# Patient Record
Sex: MALE | Race: OTHER | HISPANIC OR LATINO | Employment: PART TIME
[De-identification: names, ages, dates, MRNs, and addresses within clinical notes are randomized per-mention and may not be internally consistent; named-entity substitution may affect disease eponyms.]

---

## 2023-02-07 ENCOUNTER — OFFICE VISIT (OUTPATIENT)
Dept: INTERNAL MEDICINE | Facility: OTHER | Age: 68
End: 2023-02-07
Payer: MEDICARE

## 2023-02-07 VITALS
SYSTOLIC BLOOD PRESSURE: 133 MMHG | HEART RATE: 82 BPM | HEIGHT: 65 IN | DIASTOLIC BLOOD PRESSURE: 74 MMHG | TEMPERATURE: 97.3 F | WEIGHT: 163.8 LBS | OXYGEN SATURATION: 96 % | BODY MASS INDEX: 27.29 KG/M2

## 2023-02-07 DIAGNOSIS — I10 ESSENTIAL HYPERTENSION: ICD-10-CM

## 2023-02-07 DIAGNOSIS — Z00.00 ENCOUNTER FOR SCREENING AND PREVENTATIVE CARE: ICD-10-CM

## 2023-02-07 DIAGNOSIS — E11.9 TYPE 2 DIABETES MELLITUS WITHOUT COMPLICATION, WITHOUT LONG-TERM CURRENT USE OF INSULIN (HCC): ICD-10-CM

## 2023-02-07 DIAGNOSIS — Z11.59 ENCOUNTER FOR HEPATITIS C SCREENING TEST FOR LOW RISK PATIENT: ICD-10-CM

## 2023-02-07 DIAGNOSIS — Z43.3 COLOSTOMY CARE (HCC): ICD-10-CM

## 2023-02-07 LAB
HBA1C MFR BLD: 15 % (ref ?–5.8)
POCT INT CON NEG: NEGATIVE
POCT INT CON POS: POSITIVE

## 2023-02-07 PROCEDURE — 99204 OFFICE O/P NEW MOD 45 MIN: CPT | Mod: GC | Performed by: STUDENT IN AN ORGANIZED HEALTH CARE EDUCATION/TRAINING PROGRAM

## 2023-02-07 PROCEDURE — 83036 HEMOGLOBIN GLYCOSYLATED A1C: CPT | Performed by: STUDENT IN AN ORGANIZED HEALTH CARE EDUCATION/TRAINING PROGRAM

## 2023-02-07 RX ORDER — ATORVASTATIN CALCIUM 20 MG/1
20 TABLET, FILM COATED ORAL DAILY
Qty: 90 TABLET | Refills: 0 | Status: SHIPPED | OUTPATIENT
Start: 2023-02-07 | End: 2023-05-10 | Stop reason: SDUPTHER

## 2023-02-07 RX ORDER — GLIPIZIDE 10 MG/1
10 TABLET ORAL 2 TIMES DAILY
Qty: 60 TABLET | Refills: 0 | Status: SHIPPED | OUTPATIENT
Start: 2023-02-07 | End: 2023-05-10 | Stop reason: SDUPTHER

## 2023-02-07 RX ORDER — ATORVASTATIN CALCIUM 20 MG/1
20 TABLET, FILM COATED ORAL DAILY
COMMUNITY
Start: 2023-01-23 | End: 2023-02-07 | Stop reason: SDUPTHER

## 2023-02-07 RX ORDER — LANCETS 30 GAUGE
EACH MISCELLANEOUS
Qty: 100 EACH | Refills: 0 | Status: SHIPPED | OUTPATIENT
Start: 2023-02-07 | End: 2023-06-05 | Stop reason: SDUPTHER

## 2023-02-07 RX ORDER — AMLODIPINE BESYLATE 10 MG/1
10 TABLET ORAL DAILY
Qty: 90 TABLET | Refills: 0 | Status: SHIPPED | OUTPATIENT
Start: 2023-02-07 | End: 2023-02-07

## 2023-02-07 RX ORDER — AMLODIPINE BESYLATE 10 MG/1
10 TABLET ORAL DAILY
COMMUNITY
End: 2023-02-07 | Stop reason: SDUPTHER

## 2023-02-07 RX ORDER — ENALAPRIL MALEATE 10 MG/1
10 TABLET ORAL DAILY
Qty: 90 TABLET | Refills: 0 | Status: SHIPPED | OUTPATIENT
Start: 2023-02-07 | End: 2023-05-10 | Stop reason: SDUPTHER

## 2023-02-07 RX ORDER — ENALAPRIL MALEATE 10 MG/1
10 TABLET ORAL DAILY
COMMUNITY
Start: 2023-01-17 | End: 2023-02-07 | Stop reason: SDUPTHER

## 2023-02-07 RX ORDER — GLIPIZIDE 10 MG/1
10 TABLET ORAL 2 TIMES DAILY
COMMUNITY
Start: 2023-01-17 | End: 2023-02-07 | Stop reason: SDUPTHER

## 2023-02-07 RX ORDER — GLUCOSAMINE HCL/CHONDROITIN SU 500-400 MG
CAPSULE ORAL
Qty: 100 EACH | Refills: 0 | Status: SHIPPED | OUTPATIENT
Start: 2023-02-07 | End: 2023-06-05 | Stop reason: SDUPTHER

## 2023-02-07 ASSESSMENT — ENCOUNTER SYMPTOMS
BLOOD IN STOOL: 0
DOUBLE VISION: 0
SHORTNESS OF BREATH: 0
NAUSEA: 0
CONSTIPATION: 0
FEVER: 0
VOMITING: 0
COUGH: 0
DIARRHEA: 0
BLURRED VISION: 0
DEPRESSION: 0
SPUTUM PRODUCTION: 0
PALPITATIONS: 0
CHILLS: 0
HEMOPTYSIS: 0

## 2023-02-07 ASSESSMENT — PATIENT HEALTH QUESTIONNAIRE - PHQ9: CLINICAL INTERPRETATION OF PHQ2 SCORE: 0

## 2023-02-07 NOTE — PROGRESS NOTES
Subjective:     CC:  Diagnoses of Type 2 diabetes mellitus without complication, without long-term current use of insulin (HCC), Encounter for screening and preventative care, Encounter for hepatitis C screening test for low risk patient, and Essential hypertension were pertinent to this visit.    HISTORY OF THE PRESENT ILLNESS: Patient is a 67-year-old Zimbabwean-speaking male with a past medical history of uncontrolled type 2 diabetes, hypertension, and ostomy bag secondary to unspecified complications from hernia repair in 2019 who presents to clinic today for a establish care visit. Patient is accompanied his daughter who helped translate and provide information.    Denies any acute complaints at this time.  Patient's daughter reports that the patient was previously living in [place] where he was homeless and bouncing around city.  During this time the patient was noncompliant medical care due to his poor educational background.  Patient's daughter reports that she was able to bring the patient to [place] to live with her and has been working to get his medical care in order.    Patient has been out of his amlodipine for 2 days before clinic visit, otherwise has been taking all of his other medications.    Patient reports that he was previously told he had a heart condition that will likely require surgery in the future, but patient is unsure of what it was.    PSH: Ostomy creation 2019 during hernia repair.  FH: Diabetes in daughter, father, and mother  Social: Currently living house with daughter and 3 grandchildren.  Currently working in [employer] department at [employer].  Tobacco: Denies any previous use.  Alcohol: Reported history of heavy drinking for 30 years before cessation roughly 14 years ago.  Drugs: No previous history of drug use.  Allergies: No known drug allergies.    Problem   Type 2 Diabetes Mellitus Without Complication, Without Long-Term Current Use of Insulin (Hcc)   Essential Hypertension       Health  "Maintenance: Completed    ROS:   Review of Systems   Constitutional:  Negative for chills and fever.   HENT:  Negative for ear pain, hearing loss and tinnitus.    Eyes:  Negative for blurred vision and double vision.   Respiratory:  Negative for cough, hemoptysis, sputum production and shortness of breath.    Cardiovascular:  Negative for chest pain, palpitations and leg swelling.   Gastrointestinal:  Negative for blood in stool, constipation, diarrhea, melena, nausea and vomiting.   Genitourinary:  Negative for dysuria and urgency.   Skin:  Positive for itching. Negative for rash.   Psychiatric/Behavioral:  Negative for depression and suicidal ideas.        Objective:   Exam: /74 (BP Location: Left arm, Patient Position: Sitting, BP Cuff Size: Adult)   Pulse 82   Temp 36.3 °C (97.3 °F) (Temporal)   Ht 1.651 m (5' 5\")   Wt 74.3 kg (163 lb 12.8 oz)   SpO2 96%  Body mass index is 27.26 kg/m².    Physical Exam  Constitutional:       General: He is not in acute distress.     Appearance: Normal appearance. He is normal weight. He is not ill-appearing, toxic-appearing or diaphoretic.   HENT:      Head: Normocephalic and atraumatic.      Mouth/Throat:      Mouth: Mucous membranes are moist.      Pharynx: Oropharynx is clear. No oropharyngeal exudate or posterior oropharyngeal erythema.   Eyes:      General: No scleral icterus.        Right eye: No discharge.         Left eye: No discharge.      Conjunctiva/sclera: Conjunctivae normal.   Cardiovascular:      Rate and Rhythm: Normal rate and regular rhythm.      Pulses: Normal pulses.      Heart sounds: Murmur (3/6 systolic murmur.) heard.     No friction rub. No gallop.   Pulmonary:      Effort: Pulmonary effort is normal. No respiratory distress.      Breath sounds: Normal breath sounds. No stridor. No wheezing or rhonchi.   Abdominal:      General: Abdomen is flat. Bowel sounds are normal. There is no distension.      Palpations: Abdomen is soft. There is no " mass.      Tenderness: There is no abdominal tenderness.      Hernia: No hernia is present.      Comments: Ostomy bag on right lower quadrant with no evidence of infection around the skin.   Musculoskeletal:         General: No swelling or tenderness.      Right lower leg: No edema.      Left lower leg: No edema.   Skin:     General: Skin is warm and dry.      Coloration: Skin is not pale.      Findings: No erythema or rash.      Comments: No open lesions or wounds on foot. Excoriations from itching on both legs.   Neurological:      General: No focal deficit present.      Mental Status: He is alert and oriented to person, place, and time. Mental status is at baseline.         Labs: Please see results section.    Assessment & Plan:   Patient is a 67-year-old Ukrainian-speaking male with a past medical history of uncontrolled type 2 diabetes, hypertension, and ostomy bag secondary to unspecified complications from hernia repair in 2019 who presents to clinic today for a establish care visit. Patient is accompanied his daughter who helped translate and provide information.    1. Type 2 diabetes mellitus without complication, without long-term current use of insulin (HCC)  - POC A1c test unable to be read as it was greater than 13.  - Started patient on lantus pen 15 U QHS and prescribed diabetic supplies.  Patient's daughter says that she currently takes insulin and can teach the patient.  - Prescribed metformin and advised patient to hold off on starting it until he gets his blood work done.  - Educated patient and daughter regarding hypoglycemia signs. Patient expresses understanding.  - Comp Metabolic Panel; Future  - Lipid Profile; Future  - POCT  A1C  - MICROALBUMIN CREAT RATIO URINE; Future  - Referral to Nutrition Services    2. Encounter for screening and preventative care  - Ordered CBC for regular evaluation.  - Will get request of records from previous hospital visits.  - CBC WITH DIFFERENTIAL; Future    3.  Encounter for hepatitis C screening test for low risk patient  - Ordered test for screening.  - HEP C VIRUS ANTIBODY; Future    4. Essential hypertension  - Stopped amlodipine as patient was not taking before visit today.  - Will trial patient on enalapril and reassess at next visit.    5. Colostomy care (HCC)  - No evidence of infection around ostomy bag.  - Will refer patient to general surgery when diabetes is under better control.      I spent a total of 45 minutes with record review, exam, communication with the patient, communication with other providers, and documentation of this encounter.    Return in about 5 weeks (around 3/14/2023).

## 2023-02-07 NOTE — PATIENT INSTRUCTIONS
-Start using 15 Units of insulin at night. Please check your blood sugars in the morning and keep a log.  -If you feel that you have low blood sugar then please take something sweet.  -Stop taking the amlodipine.  -Have your labs done at your earliest convenience. I will call you after the lab results to see if you can start the metformin.  -I referred you to a nutritionist.  -Once your diabetes is under control then we will send you to surgery for further evaluation.  -Follow-up in 5 weeks.

## 2023-03-16 ENCOUNTER — APPOINTMENT (OUTPATIENT)
Dept: INTERNAL MEDICINE | Facility: OTHER | Age: 68
End: 2023-03-16
Payer: MEDICARE

## 2023-04-20 RX ORDER — PEN NEEDLE, DIABETIC 32GX 5/32"
NEEDLE, DISPOSABLE MISCELLANEOUS
Qty: 100 EACH | Refills: 2 | Status: SHIPPED | OUTPATIENT
Start: 2023-04-20 | End: 2023-05-10 | Stop reason: SDUPTHER

## 2023-05-11 RX ORDER — ATORVASTATIN CALCIUM 20 MG/1
20 TABLET, FILM COATED ORAL DAILY
Qty: 90 TABLET | Refills: 0 | Status: SHIPPED | OUTPATIENT
Start: 2023-05-11 | End: 2023-06-27

## 2023-05-11 RX ORDER — GLIPIZIDE 10 MG/1
10 TABLET ORAL 2 TIMES DAILY
Qty: 60 TABLET | Refills: 0 | OUTPATIENT
Start: 2023-05-11

## 2023-05-11 RX ORDER — PEN NEEDLE, DIABETIC 32GX 5/32"
NEEDLE, DISPOSABLE MISCELLANEOUS
Qty: 100 EACH | Refills: 2 | Status: SHIPPED | OUTPATIENT
Start: 2023-05-11 | End: 2023-07-11 | Stop reason: SDUPTHER

## 2023-05-11 RX ORDER — GLIPIZIDE 10 MG/1
10 TABLET ORAL 2 TIMES DAILY
Qty: 60 TABLET | Refills: 0 | Status: SHIPPED | OUTPATIENT
Start: 2023-05-11 | End: 2023-06-05

## 2023-05-11 RX ORDER — ENALAPRIL MALEATE 10 MG/1
10 TABLET ORAL DAILY
Qty: 90 TABLET | Refills: 0 | Status: SHIPPED | OUTPATIENT
Start: 2023-05-11 | End: 2023-06-20

## 2023-05-11 RX ORDER — PEN NEEDLE, DIABETIC 32GX 5/32"
NEEDLE, DISPOSABLE MISCELLANEOUS
Qty: 100 EACH | Refills: 2 | OUTPATIENT
Start: 2023-05-11

## 2023-06-05 ENCOUNTER — OFFICE VISIT (OUTPATIENT)
Dept: INTERNAL MEDICINE | Facility: OTHER | Age: 68
End: 2023-06-05
Payer: MEDICARE

## 2023-06-05 VITALS
WEIGHT: 162.6 LBS | DIASTOLIC BLOOD PRESSURE: 77 MMHG | HEART RATE: 86 BPM | SYSTOLIC BLOOD PRESSURE: 118 MMHG | HEIGHT: 65 IN | OXYGEN SATURATION: 97 % | TEMPERATURE: 98.3 F | BODY MASS INDEX: 27.09 KG/M2

## 2023-06-05 DIAGNOSIS — E11.9 TYPE 2 DIABETES MELLITUS WITHOUT COMPLICATION, WITHOUT LONG-TERM CURRENT USE OF INSULIN (HCC): ICD-10-CM

## 2023-06-05 DIAGNOSIS — Z43.3 COLOSTOMY CARE (HCC): ICD-10-CM

## 2023-06-05 DIAGNOSIS — Z79.4 TYPE 2 DIABETES MELLITUS WITHOUT COMPLICATION, WITH LONG-TERM CURRENT USE OF INSULIN (HCC): ICD-10-CM

## 2023-06-05 DIAGNOSIS — E11.9 TYPE 2 DIABETES MELLITUS WITHOUT COMPLICATION, WITH LONG-TERM CURRENT USE OF INSULIN (HCC): ICD-10-CM

## 2023-06-05 LAB
HBA1C MFR BLD: 10.8 % (ref ?–5.8)
POCT INT CON NEG: NEGATIVE
POCT INT CON POS: POSITIVE

## 2023-06-05 PROCEDURE — 3078F DIAST BP <80 MM HG: CPT | Performed by: STUDENT IN AN ORGANIZED HEALTH CARE EDUCATION/TRAINING PROGRAM

## 2023-06-05 PROCEDURE — 90677 PCV20 VACCINE IM: CPT | Performed by: INTERNAL MEDICINE

## 2023-06-05 PROCEDURE — 3074F SYST BP LT 130 MM HG: CPT | Performed by: STUDENT IN AN ORGANIZED HEALTH CARE EDUCATION/TRAINING PROGRAM

## 2023-06-05 PROCEDURE — G0009 ADMIN PNEUMOCOCCAL VACCINE: HCPCS | Performed by: INTERNAL MEDICINE

## 2023-06-05 PROCEDURE — 99213 OFFICE O/P EST LOW 20 MIN: CPT | Mod: GE,25 | Performed by: STUDENT IN AN ORGANIZED HEALTH CARE EDUCATION/TRAINING PROGRAM

## 2023-06-05 PROCEDURE — 83036 HEMOGLOBIN GLYCOSYLATED A1C: CPT | Performed by: STUDENT IN AN ORGANIZED HEALTH CARE EDUCATION/TRAINING PROGRAM

## 2023-06-05 RX ORDER — LANCETS 30 GAUGE
EACH MISCELLANEOUS
Qty: 100 EACH | Refills: 0 | Status: SHIPPED | OUTPATIENT
Start: 2023-06-05 | End: 2023-07-11 | Stop reason: SDUPTHER

## 2023-06-05 RX ORDER — GLUCOSAMINE HCL/CHONDROITIN SU 500-400 MG
CAPSULE ORAL
Qty: 100 EACH | Refills: 0 | Status: SHIPPED | OUTPATIENT
Start: 2023-06-05 | End: 2024-03-07 | Stop reason: SDUPTHER

## 2023-06-05 ASSESSMENT — ENCOUNTER SYMPTOMS
CHILLS: 0
CONSTIPATION: 0
BLOOD IN STOOL: 0
FEVER: 0
DIARRHEA: 0
BLURRED VISION: 0
DOUBLE VISION: 0
PALPITATIONS: 0
SHORTNESS OF BREATH: 0
NAUSEA: 0
HEMOPTYSIS: 0
VOMITING: 0
COUGH: 0
SPUTUM PRODUCTION: 0
DEPRESSION: 0

## 2023-06-05 NOTE — PROGRESS NOTES
Teaching Physician Attestation      Level of Participation    I discussed with the resident physician the patient's history, exam, assessment and plan in detail.  Topics listed in my addendum were the focus of the visit.  Healthcare maintenance was not addressed this visit unless listed as a topic in my addendum.  I agree with plan as written along with the following additions/modifications:      DM2 in setting of overweight status  -Glycemic status is unclear, patient reports having history of anemia and also chronic kidney disease, thus A1c is likely unreliable.  No hypoglycemic symptoms, A1c is 10.8 recently but again unreliable.  As such, uptitrate metformin, discontinue glipizide given patient is now on insulin, keep Lantus stable while checking fingersticks for 1 week, follow-up for close titration.  Check B12 for monitoring in setting of positive monofilament, although may be diabetes related  -nutrition consult, appreciate support  -Continue atorvastatin  -Check urine micro been to creatinine  - need to discuss eye and dental exams  -positive monofilament, no wounds on feet.  Referral to podiatry, appreciate support  -PCV 20 offered today    HTN  -at goal, no sx  -cont enalapril, bmp pending    Addendum:  On review of note after precepting, Dr. Smith referenced possibility of referring for colostomy care, we subsequently discussed moving forward with this referral sooner.  However, appears patient is now hospitalized as of the seventh.  Will follow-up on status after discharge

## 2023-06-05 NOTE — PATIENT INSTRUCTIONS
-Start taking 1000mg of metformin twice a day. You can take 2 metformin 500mg tablets in morning and night and then pick-up the prescription for the 1000mg pills when you finish your 500mg pills.  -Stop taking the glipizide.   -Have your lab work done at your earliest convenience.  -Follow-up with the nutritionist and the foot doctor (podiatrist).  -Check your glucose once in the morning before eating breakfast and keep a log to bring with you next time.  -Follow-up in 5 weeks.

## 2023-06-06 NOTE — PROGRESS NOTES
"Subjective:     CC: Diabetes follow-up.    HPI:   Patient is a 68 year Martiniquais speaking male with a PMH of HTN, DM2, Colostomy 2/2 hernia repair, CKD, and normocytic anemia who presented to the clinic for a diabetic follow-up.    Patient denies any acute complaints since previous visit. He denies any episodes of hypoglycemia and reports compliance with all of his medications since last visit. Patient says that he stopped checking his blood sugar every day since last time and did not get a chance to have his lab work done.    Patient is unsure of his diet and reports previously seeing a nutritionist in [place].    Problem   Type 2 Diabetes Mellitus Without Complication, With Long-Term Current Use of Insulin ([facility])       Health Maintenance: Completed    ROS:  Review of Systems   Constitutional:  Negative for chills and fever.   HENT:  Positive for hearing loss. Negative for ear pain and tinnitus.    Eyes:  Negative for blurred vision and double vision.   Respiratory:  Negative for cough, hemoptysis, sputum production and shortness of breath.    Cardiovascular:  Negative for chest pain, palpitations and leg swelling.   Gastrointestinal:  Negative for blood in stool, constipation, diarrhea, melena, nausea and vomiting.   Genitourinary:  Negative for dysuria and urgency.   Skin:  Negative for itching and rash.   Psychiatric/Behavioral:  Negative for depression and suicidal ideas.        Objective:     Exam:  /77 (BP Location: Left arm, Patient Position: Sitting, BP Cuff Size: Adult)   Pulse 86   Temp 36.8 °C (98.3 °F) (Temporal)   Ht 1.651 m (5' 5\")   Wt 73.8 kg (162 lb 9.6 oz)   SpO2 97%   BMI 27.06 kg/m²  Body mass index is 27.06 kg/m².    Physical Exam  Constitutional:       General: He is not in acute distress.     Appearance: Normal appearance. He is normal weight. He is not ill-appearing, toxic-appearing or diaphoretic.   HENT:      Head: Normocephalic and atraumatic.      Mouth/Throat:      Mouth: " Mucous membranes are moist.      Pharynx: Oropharynx is clear. No oropharyngeal exudate or posterior oropharyngeal erythema.   Eyes:      General: No scleral icterus.        Right eye: No discharge.         Left eye: No discharge.      Conjunctiva/sclera: Conjunctivae normal.   Cardiovascular:      Rate and Rhythm: Normal rate and regular rhythm.      Pulses: Normal pulses.      Heart sounds: Normal heart sounds. No murmur heard.     No friction rub. No gallop.   Pulmonary:      Effort: Pulmonary effort is normal. No respiratory distress.      Breath sounds: Normal breath sounds. No stridor. No wheezing or rhonchi.   Abdominal:      General: Abdomen is flat. Bowel sounds are normal. There is no distension.      Palpations: Abdomen is soft. There is no mass.      Tenderness: There is no abdominal tenderness.      Hernia: No hernia is present.   Musculoskeletal:         General: No swelling or tenderness.      Right lower leg: No edema.      Left lower leg: No edema.   Skin:     General: Skin is warm and dry.      Coloration: Skin is not pale.      Findings: No erythema or rash.      Comments: No open wounds on feet.   Neurological:      General: No focal deficit present.      Mental Status: He is alert and oriented to person, place, and time. Mental status is at baseline.      Comments: Monofilament exam 5/10 on left side and 8/10 on right side.             Labs: Please see results section for further information.    Assessment & Plan:     68 y.o. male with the following -     1. Type 2 diabetes mellitus without complication, with long-term current use of insulin ([facility])  - Repeat a1c of 10.8 down from 15 at last check.  - Will increase metformin to 1000mg bid.  - Discontinued glipizide.  - Referred patient to podiatrist.  - Advised patient to follow-up with nutritionist.  - Wait for lab work from last time before adjusting insulin.  - Gave patient the pneumococcal 20-Valent immunization.  - POCT  A1C  - VITAMIN D,25  HYDROXY (DEFICIENCY); Future  - VITAMIN B12; Future  - Referral to Podiatry  - Pneumococcal Conjugate Vaccine 20-Valent (19 yrs+)  - Lancets; Use one Accucheck Guide lancet to test blood sugar once daily early morning before first meal.  Dispense: 100 Each; Refill: 0  - Alcohol Swabs; Wipe site with prep pad prior to injection.  Dispense: 100 Each; Refill: 0  cine 20-Valent (19 yrs+)    2. Colostomy care (HCC)  - Will refer patient to general surgery with improved A1c at next visit.    I spent a total of 45 minutes with record review, exam, communication with the patient, communication with other providers, and documentation of this encounter.      Return in about 5 weeks (around 7/10/2023).

## 2023-06-20 ENCOUNTER — OFFICE VISIT (OUTPATIENT)
Dept: MEDICAL GROUP | Facility: CLINIC | Age: 68
End: 2023-06-20
Payer: MEDICARE

## 2023-06-20 VITALS
TEMPERATURE: 97.5 F | SYSTOLIC BLOOD PRESSURE: 118 MMHG | DIASTOLIC BLOOD PRESSURE: 77 MMHG | HEART RATE: 87 BPM | HEIGHT: 66 IN | WEIGHT: 158.4 LBS | BODY MASS INDEX: 25.46 KG/M2 | OXYGEN SATURATION: 97 %

## 2023-06-20 DIAGNOSIS — Z90.49 HISTORY OF COLON RESECTION: ICD-10-CM

## 2023-06-20 DIAGNOSIS — N18.9 CHRONIC KIDNEY DISEASE, UNSPECIFIED CKD STAGE: ICD-10-CM

## 2023-06-20 DIAGNOSIS — I10 ESSENTIAL HYPERTENSION: ICD-10-CM

## 2023-06-20 DIAGNOSIS — F10.11 HISTORY OF ALCOHOL ABUSE: ICD-10-CM

## 2023-06-20 DIAGNOSIS — E11.69 TYPE 2 DIABETES MELLITUS WITH OTHER SPECIFIED COMPLICATION, UNSPECIFIED WHETHER LONG TERM INSULIN USE (HCC): ICD-10-CM

## 2023-06-20 PROCEDURE — 3074F SYST BP LT 130 MM HG: CPT

## 2023-06-20 PROCEDURE — 3078F DIAST BP <80 MM HG: CPT

## 2023-06-20 PROCEDURE — 92250 FUNDUS PHOTOGRAPHY W/I&R: CPT | Mod: TC

## 2023-06-20 PROCEDURE — 99213 OFFICE O/P EST LOW 20 MIN: CPT | Mod: GE

## 2023-06-20 RX ORDER — AMLODIPINE BESYLATE 5 MG/1
5 TABLET ORAL DAILY
COMMUNITY
Start: 2023-06-12

## 2023-06-20 ASSESSMENT — FIBROSIS 4 INDEX: FIB4 SCORE: 1.38

## 2023-06-20 NOTE — PROGRESS NOTES
Subjective:     CC: Establish care and follow-up posthospitalization.    HISTORY OF THE PRESENT ILLNESS:   Paulie is 67yo male with past medical history significant for insulin-dependent, type 2 diabetes mellitus (on Lantus and metformin), essential hypertension (on enalapril),  and CKD of unspecified severity with multiple admissions to ICU (3 in the last year in [place]) who presents today to establish care and follow-up post-hospitalization (admission date 6/7/2023).  Patient is Belgian-speaking and  was utilized during today's visit.  He was recently admitted at [facility] due to nausea, vomiting and FLORA.  Patient received 1 round of hemodialysis during hospitalization due to , creatinine 10.6, glucose 582.  On day 5 of hospitalization patient returned to baseline creatinine 1.5-1.8, nausea and vomiting improved thus patient was discharged home and advised to follow-up with PCP.  Patient was started on Metformin during this hospitalization (max dose).    Current Outpatient Medications Ordered in Epic   Medication Sig Dispense Refill    amLODIPine (NORVASC) 5 MG Tab Take 5 mg by mouth every day. for 30 days      metformin (GLUCOPHAGE) 1000 MG tablet Take 1 Tablet by mouth 2 times a day with meals. 60 Tablet 4    Lancets Use one AccQuintiqeck Guide lancet to test blood sugar once daily early morning before first meal. 100 Each 0    Alcohol Swabs Wipe site with prep pad prior to injection. 100 Each 0    atorvastatin (LIPITOR) 20 MG Tab Take 1 Tablet by mouth every day. 90 Tablet 0    insulin glargine 100 UNIT/ML SC SOPN injection Inject 15 Units under the skin every evening. 5 Each 1    Insulin Pen Needle 32 G x 4 mm (BD PEN NEEDLE RANDY 2ND GEN) USE 1  ONCE DAILY 100 Each 2    Blood Glucose Meter Kit Test blood sugar as recommended by provider. Accucheck Guide blood glucose monitoring kit. 1 Kit 0    Blood Glucose Test Strips Use one Accucheck Guide strip to test blood sugar once  "daily early morning before first meal. 100 Strip 0     No current Epic-ordered facility-administered medications on file.     ROS:   Gen: no fevers/chills, no changes in weight  Eyes: no changes in vision  ENT: no changes in hearing  Pulm: no sob, no cough  CV: no chest pain, no palpitations  GI: no nausea/vomiting, no diarrhea  MSk: no myalgias  Skin: no rash  Neuro: no headaches, no numbness/tingling  Objective:   Exam: /77 (BP Location: Left arm, Patient Position: Sitting, BP Cuff Size: Adult)   Pulse 87   Temp 36.4 °C (97.5 °F) (Temporal)   Ht 1.664 m (5' 5.5\")   Wt 71.8 kg (158 lb 6.4 oz)   SpO2 97%  Body mass index is 25.96 kg/m².    General: Normal appearing. No distress.  HEENT: Normocephalic. Eyes conjunctiva clear lids without ptosis, pupils equal and reactive to light accommodation, ears normal shape and contour, canals are clear bilaterally, tympanic membranes are benign, nasal mucosa benign, oropharynx is without erythema, edema or exudates.   Neck: Supple without JVD or bruit. Thyroid is not enlarged.  Pulmonary: Clear to ausculation.  Normal effort. No rales, ronchi, or wheezing.  Cardiovascular: Regular rate and rhythm without murmur. Carotid and radial pulses are intact and equal bilaterally.  Abdomen: Soft, nontender, nondistended. Normal bowel sounds. Liver and spleen are not palpable. Ostomy bag present at RLQ of abdomen; surrounding area clean, dry and intact. No surrounding erythema or concerns for infection. Mild tenderness to palpation around ostomy site.  Neurologic: Grossly nonfocal  Lymph: No cervical or supraclavicular lymph nodes are palpable  Skin: Warm and dry.  No obvious lesions.  Musculoskeletal: Normal gait. No extremity cyanosis, clubbing, or edema.  Psych: Normal mood and affect. Alert and oriented x3. Judgment and insight is normal.    Labs:   Recent labs done at [facility] during hospitalization. All labs obtained and uploaded to patient's chart.   Results for " orders placed or performed in visit on 06/05/23   POCT  A1C   Result Value Ref Range    Glycohemoglobin 10.8 (A) 5.8 %    Internal Control Positive Positive     Internal Control Negative Negative      Assessment & Plan:   68 y.o. male with the following:    #CKD, unknown etiology and staging  Recent lab work during hospitalization showed , creatinine 10. S/p HD x1. Labs returned to baseline following HD. Previous anti-hypertensive discontinued *see below.  -Referral to Nephrology    #Type 2 diabetes mellitus with other specified complication, unspecified whether long term insulin use (HCC)  - POCT Retinal Eye Exam  - Continue Lantus 15 units nightly  - Continue Metformin 1000mg BID  - Keep morning fasting blood glucose log until next visit.    - Referral to Endocrinology    #Essential Hypertension  Discontinue enalapril in the setting of CKD.   -Continue Amlodipine 5mg daily.   -Referral to Nephrology    #History of colon resection; ostomy bag in place  - Referral to Gastroenterology     At next visit:  -Review blood glucose log for fasting glucose recordings at home. Consider adjusting insulin regimen at that time or starting Premeal insulin if indicated.  -Follow-up on all referrals: Gastroenterology, endocrinology and nephrology.  -Due to language barrier: confirm med compliance and all names of medications patient is currently taking.    Lenora Ragsdale MD  Resident Intern  [facility], Family Medicine

## 2023-06-20 NOTE — ASSESSMENT & PLAN NOTE
Discontinue enalapril in the setting of CKD.   -Start Amlodipine 5mg daily.   -Nephrology referral in place.

## 2023-06-21 LAB — RETINAL SCREEN: NEGATIVE

## 2023-06-27 RX ORDER — ATORVASTATIN CALCIUM 20 MG/1
TABLET, FILM COATED ORAL
Qty: 90 TABLET | Refills: 0 | Status: SHIPPED | OUTPATIENT
Start: 2023-06-27 | End: 2023-07-11 | Stop reason: SDUPTHER

## 2023-07-05 ENCOUNTER — PATIENT MESSAGE (OUTPATIENT)
Dept: MEDICAL GROUP | Facility: CLINIC | Age: 68
End: 2023-07-05
Payer: MEDICARE

## 2023-07-10 ENCOUNTER — APPOINTMENT (OUTPATIENT)
Dept: MEDICAL GROUP | Facility: CLINIC | Age: 68
End: 2023-07-10
Payer: MEDICARE

## 2023-07-11 ENCOUNTER — OFFICE VISIT (OUTPATIENT)
Dept: INTERNAL MEDICINE | Facility: OTHER | Age: 68
End: 2023-07-11
Payer: MEDICARE

## 2023-07-11 VITALS
WEIGHT: 161.6 LBS | DIASTOLIC BLOOD PRESSURE: 74 MMHG | HEIGHT: 64 IN | TEMPERATURE: 98.2 F | OXYGEN SATURATION: 97 % | SYSTOLIC BLOOD PRESSURE: 142 MMHG | HEART RATE: 78 BPM | BODY MASS INDEX: 27.59 KG/M2

## 2023-07-11 DIAGNOSIS — R74.01 TRANSAMINITIS: ICD-10-CM

## 2023-07-11 DIAGNOSIS — I10 ESSENTIAL HYPERTENSION: ICD-10-CM

## 2023-07-11 DIAGNOSIS — Z79.4 TYPE 2 DIABETES MELLITUS WITHOUT COMPLICATION, WITH LONG-TERM CURRENT USE OF INSULIN (HCC): ICD-10-CM

## 2023-07-11 DIAGNOSIS — Z93.9 HISTORY OF CREATION OF OSTOMY (HCC): ICD-10-CM

## 2023-07-11 DIAGNOSIS — N18.32 STAGE 3B CHRONIC KIDNEY DISEASE: ICD-10-CM

## 2023-07-11 DIAGNOSIS — E11.9 TYPE 2 DIABETES MELLITUS WITHOUT COMPLICATION, WITH LONG-TERM CURRENT USE OF INSULIN (HCC): ICD-10-CM

## 2023-07-11 PROCEDURE — 3078F DIAST BP <80 MM HG: CPT | Mod: GC

## 2023-07-11 PROCEDURE — 3077F SYST BP >= 140 MM HG: CPT | Mod: GC

## 2023-07-11 PROCEDURE — 99214 OFFICE O/P EST MOD 30 MIN: CPT | Mod: GC

## 2023-07-11 RX ORDER — ENALAPRIL MALEATE 10 MG/1
10 TABLET ORAL DAILY
Qty: 90 TABLET | Refills: 0 | Status: SHIPPED | OUTPATIENT
Start: 2023-07-11 | End: 2023-07-12

## 2023-07-11 RX ORDER — ATORVASTATIN CALCIUM 20 MG/1
20 TABLET, FILM COATED ORAL DAILY
Qty: 90 TABLET | Refills: 0 | Status: SHIPPED | OUTPATIENT
Start: 2023-07-11 | End: 2023-07-12

## 2023-07-11 RX ORDER — AMLODIPINE BESYLATE 5 MG/1
5 TABLET ORAL DAILY
Qty: 90 TABLET | Refills: 0 | Status: CANCELLED | OUTPATIENT
Start: 2023-07-11 | End: 2023-10-09

## 2023-07-11 RX ORDER — LANCETS 30 GAUGE
EACH MISCELLANEOUS
Qty: 100 EACH | Refills: 0 | Status: SHIPPED | OUTPATIENT
Start: 2023-07-11 | End: 2023-07-12

## 2023-07-11 RX ORDER — PEN NEEDLE, DIABETIC 32GX 5/32"
NEEDLE, DISPOSABLE MISCELLANEOUS
Qty: 100 EACH | Refills: 2 | Status: SHIPPED | OUTPATIENT
Start: 2023-07-11 | End: 2023-07-12

## 2023-07-11 ASSESSMENT — ENCOUNTER SYMPTOMS
MUSCULOSKELETAL NEGATIVE: 1
CONSTITUTIONAL NEGATIVE: 1
RESPIRATORY NEGATIVE: 1
EYES NEGATIVE: 1
GASTROINTESTINAL NEGATIVE: 1
CARDIOVASCULAR NEGATIVE: 1

## 2023-07-11 ASSESSMENT — FIBROSIS 4 INDEX
FIB4 SCORE: 1.38
FIB4 SCORE: 1.38

## 2023-07-11 NOTE — PATIENT INSTRUCTIONS
Endocrinology     HEMAL FUENTES  [address] [address]  Phone: [phone]\\Referral information sent to the following:  Gastroenterology     [facility]  [address] [place] [id number]  Phone: [phone]
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
## 2023-07-12 RX ORDER — PEN NEEDLE, DIABETIC 32GX 5/32"
NEEDLE, DISPOSABLE MISCELLANEOUS
Qty: 100 EACH | Refills: 2 | Status: SHIPPED | OUTPATIENT
Start: 2023-07-12 | End: 2024-03-07 | Stop reason: SDUPTHER

## 2023-07-12 RX ORDER — LANCETS 30 GAUGE
EACH MISCELLANEOUS
Qty: 100 EACH | Refills: 0 | Status: SHIPPED | OUTPATIENT
Start: 2023-07-12 | End: 2024-03-07 | Stop reason: SDUPTHER

## 2023-07-12 RX ORDER — ENALAPRIL MALEATE 10 MG/1
10 TABLET ORAL DAILY
Qty: 90 TABLET | Refills: 0 | Status: SHIPPED | OUTPATIENT
Start: 2023-07-12 | End: 2024-01-08

## 2023-07-12 RX ORDER — ATORVASTATIN CALCIUM 20 MG/1
20 TABLET, FILM COATED ORAL DAILY
Qty: 90 TABLET | Refills: 0 | Status: SHIPPED | OUTPATIENT
Start: 2023-07-12 | End: 2023-10-10

## 2023-07-13 ENCOUNTER — TELEPHONE (OUTPATIENT)
Dept: INTERNAL MEDICINE | Facility: OTHER | Age: 68
End: 2023-07-13
Payer: MEDICARE

## 2023-07-13 DIAGNOSIS — Z43.3 COLOSTOMY CARE (HCC): Primary | ICD-10-CM

## 2023-07-13 NOTE — TELEPHONE ENCOUNTER
Patient requesting  Dme orders for ostomy bags. Please call Pam  patient's daughter when process phone number [phone]. And fax number [phone].

## 2023-07-14 NOTE — TELEPHONE ENCOUNTER
Placed order for ostomy pouch DME.  Wound care consult placed for future request as patient to reach out to [facility] wound care center regarding refills on ostomy pouch.

## 2023-07-21 ENCOUNTER — NON-PROVIDER VISIT (OUTPATIENT)
Dept: WOUND CARE | Facility: MEDICAL CENTER | Age: 68
End: 2023-07-21
Payer: MEDICARE

## 2023-07-21 PROCEDURE — 99211 OFF/OP EST MAY X REQ PHY/QHP: CPT

## 2023-07-21 NOTE — CERTIFICATION
"Advanced Wound Care  [facility]  [address]  [place] [address]  [phone] Fax#    Initial Ostomy Evaluation  For 90 Day Certification Period:  07/21/2023-10/21/2023    Referring Provider: Leeanne Gruber D.O.   Primary Provider: Lenora Ragsdale M.D.  Consulting Providers: none today  Surgeon: unknown, ostomy creation in [place], 3 years ago      Start of Care: 07/21/2023    Reason for referral: Patient with established ostomy needing assistance with ordering of supplies and trouble shooting issues      SUBJECTIVE: \"I change it every 2 days because it's dirty.\"    HPI: Patient is a 67 y/o male with history of ostomy creation. Patient unfortunately is a poor historian and states that he recently moved here from [email or web address] and per granddaughter at bedside, he has been getting supplies from [facility]. Patient was referred to help order ostomy supplies through DME. Patient can only state that he had the ostomy created due to a hernia, however, there is a parastomal hernia present upon assessment. Patient also cannot say whether he had a colostomy or ileostomy created.     Past Medical Hx:  No past medical history on file.     Surgical Hx:  No past surgical history on file.     Medications:  Current Outpatient Medications on File Prior to Visit   Medication Sig Dispense Refill    atorvastatin (LIPITOR) 20 MG Tab Take 1 Tablet by mouth every day for 90 days. 90 Tablet 0    Blood Glucose Test Strips Use one Accucheck Guide strip to test blood sugar once daily early morning before first meal. 100 Strip 0    enalapril (VASOTEC) 10 MG Tab Take 1 Tablet by mouth every day. 90 Tablet 0    insulin glargine 100 UNIT/ML SC SOPN injection Inject 15 Units under the skin every evening. 5 Each 0    Insulin Pen Needle 32 G x 4 mm (BD PEN NEEDLE RANDY 2ND GEN) USE 1  ONCE DAILY 100 Each 2    Lancets Use one Accucheck Guide lancet to test blood sugar once daily early morning before first meal. " "100 Each 0    metFORMIN (GLUCOPHAGE) 500 MG Tab Take 1 Tablet by mouth 2 times a day with meals for 90 days. 180 Tablet 0    amLODIPine (NORVASC) 5 MG Tab Take 5 mg by mouth every day. for 30 days      Alcohol Swabs Wipe site with prep pad prior to injection. 100 Each 0    Blood Glucose Meter Kit Test blood sugar as recommended by provider. Accucheck Guide blood glucose monitoring kit. 1 Kit 0     No current facility-administered medications on file prior to visit.      Allergies:  No Known Allergies     Social Hx:  Social History     Socioeconomic History    Marital status: Single   Tobacco Use    Smoking status: Never    Smokeless tobacco: Never   Vaping Use    Vaping Use: Never used   Substance and Sexual Activity    Alcohol use: Never    Drug use: Never        OBJECTIVE: 1 piece Flextend 2 1/2\" appliance in place with cloth tape border, minimal wear noted on inside of barrier (patient states that he replaced it this morning)    STOMA ASSESSMENT:   Type:  ____Ileostomy     ____Colostomy    ____Urostomy    __X unknown whether ileo or colo__     Location: RLQ    Size: ~ 1 3/4\" X 2\"   Shape: oval   Color: red   Protrudes:      ___ >1 inch               _X__ <1 inch   [place]: center and patent    Mucocutaneous Junction: intact    Skin indentations, creases or scar tissue: midline scar, not creating issue at this time, medial crease when sitting up, parastomal hernia laterally   Anna-stomal Skin Problems: denuded medial and inferior to stoma   Effluent / Flatus: liquid green, with small solid green stool   Photo  __X__ Yes ____ No    PATIENT LAYING BACK      PATIENT SITTING UP      Pouching Procedure:   Old ostomy appliance removed using [employer] medical adhesive remover spray, and by pushing skin away from appliance to avoid skin tears.      Peristomal skin cleansed with: warm water and gauze, dried   Peristomal skin treated with: crusting with no sting skin prep and stoma powder x 2 to denuded skin   Ostomy appliance " "used:  1 piece flat flextend 2 1/2\" (8531) CTF, lubricating deodorant (88569), Coloplast brava strips  x 2      Patient Education: All instructions given through ipad : Patient instructed on above pouching procedure and rationale for use. Patient had been cutting barrier much larger than needed. Educated patient that this was likely the reason for the irritation and leaks he reported. Patient has previously been receiving his supplies through [facility] since arriving to [place] a couple months ago. Requested that patient make a request to the hospital he was seen at in [place] to have op report as it is unclear if this is an ileostomy or colostomy. Patient states that he was never told. Instructed patient on proper crusting technique and when to crust. Patient also instructed to rinse pouch with warm water after emptying and apply lubricating deodorant into pouch, each time after it is emptied. Anna bottle given to patient for rinsing and instructed on use. Extra appliances and deodorant samples given to patient. Patient wanted to try the brava strips as he feels he needs the tape border. The entire 60 min appt consisted of discussing the above pouching procedure and the need to not cut the barrier too large. At next visit, a discussion on hernia belts and their use could be discussed. Patient did not want to use a paste ring today. Stated that he tried it before and it \"melted and made a mess\". Instructed patient that a wear time goal is 5-7 days (if it is a colostomy) and to not change it every other day because it is \"dirty\". Instructed patient that rinsing the pouch and using the deodorant will help to keep the inside of the pouch . Patient verbalized understanding to all instructions as did granddaughter    Verbal/demonstration instructions of above pouching procedure provided to patient/family.      Response: verbalized understanding to all instructions      PLAN: Return in one week to evaluated " nikki stomal skin and efficacy of appliance    Supplies Needed:   Appliance type:    [place] as above             Other:      Accessories: none       Supplier: none, patient will need supply order through [facility] when product selection is finalized.    Frequency: in one week, then likely prn if no issues      Professional Collaboration:  Evaluation notes forwarded to referring provider.      At the time of each visit, a thorough assessment of the patient is completed to assure appropriateness of our plan of care.  The plan of care may need to be adapted from the original plan of care to address any significant changes in patient status.    Clinician Signature:  _________________________________  Date:  ____________    Physician Signature:  ________________________________  Date:  ____________

## 2023-07-22 NOTE — PATIENT INSTRUCTIONS
Change urostomies and ileostomies every 3-5 days. Change colostomies every 5-7 days. Change appliance immediately if it is leaking or peristomal skin feels irritated, has itching, or  burning.   To change the appliance, remove previous appliance, cleanse peristomal skin with warm water/washcloth, pat dry, make an ostomy template or use cardboard measuring guide and trace ostomy shape onto back of barrier, cut out barrier, apply a paste ring around barrier opening and apply appliance. Empty pouches when no more than ½ full. Check contents every 2 hours or as needed. Do not leave soap residue on tissue and do not use baby wipes or skin prep wipes.     Should you experience any significant changes in your condition, such as infection (redness, swelling, localized heat, increased pain, fever > 101 F, chills) or have any questions regarding your home care instructions, please contact the wound center at [phone]. If after hours, contact your primary care physician or go to the hospital emergency room.

## 2023-10-13 ENCOUNTER — NON-PROVIDER VISIT (OUTPATIENT)
Dept: WOUND CARE | Facility: MEDICAL CENTER | Age: 68
End: 2023-10-13
Payer: MEDICARE

## 2023-10-13 PROCEDURE — 99211 OFF/OP EST MAY X REQ PHY/QHP: CPT

## 2023-10-13 NOTE — PATIENT INSTRUCTIONS
Change urostomies and ileostomies every 3-5 days. Change colostomies every 5-7 days. Change appliance immediately if it is leaking or peristomal skin feels irritated, has itching, or  burning.   To change the appliance, remove previous appliance, cleanse peristomal skin with warm water/washcloth, pat dry, make an ostomy template or use cardboard measuring guide and trace ostomy shape onto back of barrier, cut out barrier, apply a paste ring around barrier opening and apply appliance. Empty pouches when no more than ½ full. Check contents every 2 hours or as needed. Do not leave soap residue on tissue and do not use baby wipes or skin prep wipes.     Should you experience any significant changes in your condition, such as infection (redness, swelling, localized heat, increased pain, fever > 101 F, chills) or have any questions regarding your home care instructions, please contact the wound center at [phone]. If after hours, contact your primary care physician or go to the hospital emergency room.

## 2023-10-13 NOTE — WOUND TEAM
"Advanced Wound Care  [facility]  [address]  [place] [address]  [phone] Fax#    Ostomy Progress Note  For 90 Day Certification Period:  07/21/2023-10/21/2023    Referring Provider: Leeanne Gruber D.O.   Primary Provider: Lenora Ragsdale M.D.  Consulting Providers: none today  Surgeon: unknown, ostomy creation in [place], 3 years ago      Start of Care: 07/21/2023    Reason for referral: Patient with established ostomy needing assistance with ordering of supplies and trouble shooting issues      SUBJECTIVE: \"I just want supplies.\"- via I pad     HPI: Patient is a 69 y/o male with history of ostomy creation. Patient unfortunately is a poor historian and states that he recently moved here from [place] and per granddaughter at bedside, he has been getting supplies from [facility]. Patient was referred to help order ostomy supplies through DME. Patient can only state that he had the ostomy created due to a hernia, however, there is a parastomal hernia present upon assessment. Patient also cannot say whether he had a colostomy or ileostomy created.     Past Medical Hx:  No past medical history on file.     Surgical Hx:  No past surgical history on file.     Medications:  Current Outpatient Medications on File Prior to Visit   Medication Sig Dispense Refill    Blood Glucose Test Strips Use one Accucheck Guide strip to test blood sugar once daily early morning before first meal. 100 Strip 0    enalapril (VASOTEC) 10 MG Tab Take 1 Tablet by mouth every day. 90 Tablet 0    insulin glargine 100 UNIT/ML SC SOPN injection Inject 15 Units under the skin every evening. 5 Each 0    Insulin Pen Needle 32 G x 4 mm (BD PEN NEEDLE RANDY 2ND GEN) USE 1  ONCE DAILY 100 Each 2    Lancets Use one Accucheck Guide lancet to test blood sugar once daily early morning before first meal. 100 Each 0    amLODIPine (NORVASC) 5 MG Tab Take 5 mg by mouth every day. for 30 days      Alcohol " "Swabs Wipe site with prep pad prior to injection. 100 Each 0    Blood Glucose Meter Kit Test blood sugar as recommended by provider. Accucheck Guide blood glucose monitoring kit. 1 Kit 0     No current facility-administered medications on file prior to visit.      Allergies:  No Known Allergies     Social Hx:  Social History     Socioeconomic History    Marital status: Single   Tobacco Use    Smoking status: Never    Smokeless tobacco: Never   Vaping Use    Vaping Use: Never used   Substance and Sexual Activity    Alcohol use: Never    Drug use: Never        OBJECTIVE: 1 piece soft convex 1 1/2\" appliance in place with plastic tape border, caked on stool noted on inside of barrier.    STOMA ASSESSMENT:   Type:  ____Ileostomy     ____Colostomy    ____Urostomy    __X unknown whether ileo or colo__     Location: RLQ    Size: ~ 1 3/4\" X 1 3/4\"   Shape: oval   Color: red   Protrudes:      ___ >1 inch               _X__ <1 inch   [place]: center and patent    Mucocutaneous Junction: intact    Skin indentations, creases or scar tissue: midline scar, not creating issue at this time, medial crease when sitting up, parastomal hernia laterally   Anna-stomal Skin Problems: denuded lateral and inferior to stoma   Effluent / Flatus: liquid green   Photo  __X__ Yes ____ No    PATIENT LAYING BACK      Pouching Procedure:   Old ostomy appliance removed using [employer] medical adhesive remover spray, and by pushing skin away from appliance to avoid skin tears.      Peristomal skin cleansed with: warm water and gauze, dried   Peristomal skin treated with: crusting with no sting skin prep and stoma powder x 2 to denuded skin   Ostomy appliance used:  1 piece 1 1/2\" soft convex one piece (8958) CTF, 2\" slim paste ring (8815), lubricating deodorant (21172), Coloplast brava strips  x 2. (LAST VISIT PATIENT WAS GIVEN A LARGER 1 PIECE FLAT 8531 HE IS NOT USING IT)      Patient Education: All instructions given through ipad : Patient " instructed on above pouching procedure and rationale for use. Patient perseverates on getting supplies and interrupts when  is talking and RN is trying to teach. Patient's current pouch is too tight, but it is what he is requesting. Patient was originally seen in July and never came to follow up after patient was started on a flat 8531. Patient continues to get supplies from [facility] and continues with leaks and denuded skin. Instructed patient on proper crusting technique and when to crust. Patient also instructed to rinse pouch with warm water after emptying and apply lubricating deodorant into pouch, each time after it is emptied. Nikki bottle given to patient for rinsing and instructed on use. Extra appliances and deodorant samples given to patient.  Since patient never came to follow up in July, he is agreeable to come next week and trial a different appliance that will fit better and at next visit, a discussion on hernia belts and their use could be discussed. Instructed patient that rinsing the pouch and using the deodorant will help to keep the inside of the pouch . Instructed patient that supplies will not be ordered until product selection finalized. Extra appliances given to patient today. Patient verbalized understanding to all instructions.    Verbal/demonstration instructions of above pouching procedure provided to patient/family.      Response: verbalized understanding to all instructions      PLAN: Return in one week to evaluated nikki stomal skin and efficacy of appliance    Supplies Needed:   Appliance type:    [place] as above             Other:      Accessories: none       Supplier: none, patient will need supply order through [facility] when product selection is finalized.    Frequency: in one week      Professional Collaboration:  None today.      At the time of each visit, a thorough assessment of the patient is completed to assure appropriateness of our plan of care.  The plan  of care may need to be adapted from the original plan of care to address any significant changes in patient status.    Clinician Signature:  _________________________________  Date:  ____________    Physician Signature:  ________________________________  Date:  ____________

## 2023-10-23 ENCOUNTER — NON-PROVIDER VISIT (OUTPATIENT)
Dept: WOUND CARE | Facility: MEDICAL CENTER | Age: 68
End: 2023-10-23
Payer: MEDICARE

## 2023-10-23 PROCEDURE — 99211 OFF/OP EST MAY X REQ PHY/QHP: CPT

## 2023-10-23 NOTE — PATIENT INSTRUCTIONS
Change colostomies every 5-7 days. Change appliance immediately if it is leaking or peristomal skin feels irritated, has itching, or  burning.   To change the appliance, remove previous appliance, cleanse peristomal skin with warm water/washcloth, pat dry, make an ostomy template or use cardboard measuring guide and trace ostomy shape onto back of barrier, cut out barrier, apply a paste ring around barrier opening and apply appliance. Empty pouches when no more than ½ full. Check contents every 2 hours or as needed. Do not leave soap residue on tissue and do not use baby wipes or skin prep wipes.     Should you experience any significant changes in your condition, such as infection (redness, swelling, localized heat, increased pain, fever > 101 F, chills) or have any questions regarding your home care instructions, please contact the wound center at [phone]. If after hours, contact your primary care physician or go to the hospital emergency room.

## 2023-10-23 NOTE — WOUND TEAM
"Advanced Wound Care  [facility]  [address]  [place] [address]  [phone] Fax#    Ostomy Progress Note  For 90 Day Certification Period:  07/21/2023-10/21/2023    Referring Provider: Leeanne Gruber D.O.   Primary Provider: Lenora Ragsdale M.D.  Consulting Providers: none today  Surgeon: unknown, ostomy creation in [place], 3 years ago      Start of Care: 07/21/2023    Reason for referral: Patient with established ostomy needing assistance with ordering of supplies and trouble shooting issues      SUBJECTIVE: \"No leaks, I'm doing fine.\" Via granddaughter translating    HPI: Patient is a 67 y/o male with history of ostomy creation. Patient unfortunately is a poor historian and states that [email or web address] and per granddaughter at bedside, he has been getting supplies from [facility]. Patient was referred to help order ostomy supplies through DME. Patient can only state that he had the ostomy created due to a hernia, however, there is a parastomal hernia present upon assessment. Patient also cannot say whether he had a colostomy or ileostomy created.     Past Medical Hx:  No past medical history on file.     Surgical Hx:  No past surgical history on file.     Medications:  Current Outpatient Medications on File Prior to Visit   Medication Sig Dispense Refill    Blood Glucose Test Strips Use one Accucheck Guide strip to test blood sugar once daily early morning before first meal. 100 Strip 0    enalapril (VASOTEC) 10 MG Tab Take 1 Tablet by mouth every day. 90 Tablet 0    insulin glargine 100 UNIT/ML SC SOPN injection Inject 15 Units under the skin every evening. 5 Each 0    Insulin Pen Needle 32 G x 4 mm (BD PEN NEEDLE RANDY 2ND GEN) USE 1  ONCE DAILY 100 Each 2    Lancets Use one Accucheck Guide lancet to test blood sugar once daily early morning before first meal. 100 Each 0    amLODIPine (NORVASC) 5 MG Tab Take 5 mg by mouth every day. for 30 days      " "Alcohol Swabs Wipe site with prep pad prior to injection. 100 Each 0    Blood Glucose Meter Kit Test blood sugar as recommended by provider. Accucheck Guide blood glucose monitoring kit. 1 Kit 0     No current facility-administered medications on file prior to visit.      Allergies:  No Known Allergies     Social Hx:  Social History     Socioeconomic History    Marital status: Single   Tobacco Use    Smoking status: Never    Smokeless tobacco: Never   Vaping Use    Vaping Use: Never used   Substance and Sexual Activity    Alcohol use: Never    Drug use: Never        OBJECTIVE: 1 piece soft convex 1 1/2\" appliance in place with plastic tape border to lateral edge and Brava strip to medial edge    STOMA ASSESSMENT:   Type:  ____Ileostomy     ____Colostomy    ____Urostomy    __X unknown whether ileo or colo__     Location: RLQ    Size: ~ 1 1/2\" X 1 3/4\"   Shape: oval   Color: red   Protrudes:      ___ >1 inch               _X__ <1 inch   [place]: center and patent    Mucocutaneous Junction: intact    Skin indentations, creases or scar tissue: midline scar, not creating issue at this time, medial crease when sitting up, parastomal hernia laterally   Anna-stomal Skin Problems: denuded lateral and inferior to stoma   Effluent / Flatus: Thin brown   Photo  __X__ Yes ____ No    PATIENT LAYING BACK        Pouching Procedure:   Old ostomy appliance removed using [place] medical adhesive remover spray (31), and by pushing skin away from appliance to avoid skin tears.      Peristomal skin cleansed with: warm water and gauze, dried   Peristomal skin treated with: crusting with no sting skin prep and stoma powder (5906) x 2 to denuded skin   Ostomy appliance used:  1 piece all flexible (11601) CTF, 2\" slim paste ring (1815), lubricating deodorant (17776), Coloplast brava strips  x 2.     Patient Education: All instructions and education given through granddaughter as :     Patient instructed on above pouching " procedure and rationale for use. Discussed need for appropriate crusting of denuded skin. Educated patient to change appliance in 3-4 days, instead of 7, so the skin can be cleaned and crusted again. Patient verbalized understanding.     Provided with extra flat all flexible appliance (21852) to try as well.     Change ileostomies every 3-5 days. Change appliance immediately if it is leaking or peristomal skin feels irritated, has itching, or  burning.   To change the appliance, remove previous appliance, cleanse peristomal skin with warm water/washcloth, pat dry, make an ostomy template or use cardboard measuring guide and trace ostomy shape onto back of barrier, cut out barrier, apply a paste ring around barrier opening and apply appliance. Empty pouches when no more than ½ full. Check contents every 2 hours or as needed. Do not leave soap residue on tissue and do not use baby wipes or skin prep wipes.     Should you experience any significant changes in your condition, such as infection (redness, swelling, localized heat, increased pain, fever > 101 F, chills) or have any questions regarding your home care instructions, please contact the wound center at [phone]. If after hours, contact your primary care physician or go to the hospital emergency room.      Verbal/demonstration instructions of above pouching procedure provided to patient/family.      Response: verbalized understanding to all instructions      PLAN: Return in one week to evaluated nikki stomal skin and efficacy of appliance    Supplies Needed:   Appliance type:    Jacquie as above             Other:      Accessories: none       Supplier: none, patient will need supply order through [place] when product selection is finalized.    Frequency: in one week      Professional Collaboration:  None today.      At the time of each visit, a thorough assessment of the patient is completed to assure appropriateness of our plan of care.  The plan of care  may need to be adapted from the original plan of care to address any significant changes in patient status.

## 2023-10-30 ENCOUNTER — NON-PROVIDER VISIT (OUTPATIENT)
Dept: WOUND CARE | Facility: MEDICAL CENTER | Age: 68
End: 2023-10-30
Payer: MEDICARE

## 2023-10-30 PROCEDURE — 99211 OFF/OP EST MAY X REQ PHY/QHP: CPT

## 2023-10-30 NOTE — PATIENT INSTRUCTIONS
Change ileostomies every 3-5 days. Change appliance immediately if it is leaking or peristomal skin feels irritated, has itching, or  burning.   To change the appliance, remove previous appliance, cleanse peristomal skin with warm water/washcloth, pat dry, make an ostomy template or use cardboard measuring guide and trace ostomy shape onto back of barrier, cut out barrier, apply a paste ring around barrier opening and apply appliance. Empty pouches when no more than ½ full. Check contents every 2 hours or as needed. Do not leave soap residue on tissue and do not use baby wipes or skin prep wipes.     Should you experience any significant changes in your condition, such as infection (redness, swelling, localized heat, increased pain, fever > 101 F, chills) or have any questions regarding your home care instructions, please contact the wound center at [phone]. If after hours, contact your primary care physician or go to the hospital emergency room.

## 2023-10-30 NOTE — WOUND TEAM
"Advanced Wound Care  [facility]  [address]  [place] [address]  [phone] Fax#    Ostomy Progress Note  For 90 Day Certification Period:  07/21/2023-10/21/2023    Referring Provider: Leeanne Gruber D.O.   Primary Provider: Lenora Ragsdale M.D.  Consulting Providers: none today  Surgeon: unknown, ostomy creation in [place], 3 years ago      Start of Care: 07/21/2023    Reason for referral: Patient with established ostomy needing assistance with ordering of supplies and trouble shooting issues      SUBJECTIVE: \"I liked the smaller one (15303).\" Via granddaughter translating    HPI: Patient is a 69 y/o male with history of ostomy creation. Patient unfortunately is a poor historian and states that he recently moved here from [place] and per granddaughter at bedside, he has been getting supplies from [facility]. Patient was referred to help order ostomy supplies through DME. Patient can only state that he had the ostomy created due to a hernia, however, there is a parastomal hernia present upon assessment. Patient also cannot say whether he had a colostomy or ileostomy created.     Past Medical Hx:  No past medical history on file.     Surgical Hx:  No past surgical history on file.     Medications:  Current Outpatient Medications on File Prior to Visit   Medication Sig Dispense Refill    Blood Glucose Test Strips Use one Accucheck Guide strip to test blood sugar once daily early morning before first meal. 100 Strip 0    enalapril (VASOTEC) 10 MG Tab Take 1 Tablet by mouth every day. 90 Tablet 0    insulin glargine 100 UNIT/ML SC SOPN injection Inject 15 Units under the skin every evening. 5 Each 0    Insulin Pen Needle 32 G x 4 mm (BD PEN NEEDLE RANDY 2ND GEN) USE 1  ONCE DAILY 100 Each 2    Lancets Use one Accucheck Guide lancet to test blood sugar once daily early morning before first meal. 100 Each 0    amLODIPine (NORVASC) 5 MG Tab Take 5 mg by mouth every day. for 30 " "days      Alcohol Swabs Wipe site with prep pad prior to injection. 100 Each 0    Blood Glucose Meter Kit Test blood sugar as recommended by provider. Accucheck Guide blood glucose monitoring kit. 1 Kit 0     No current facility-administered medications on file prior to visit.      Allergies:  No Known Allergies     Social Hx:  Social History     Socioeconomic History    Marital status: Single   Tobacco Use    Smoking status: Never    Smokeless tobacco: Never   Vaping Use    Vaping Use: Never used   Substance and Sexual Activity    Alcohol use: Never    Drug use: Never        OBJECTIVE: 1 piece all flexible appliance in place with plastic tape border to lateral edge and Brava strip to medial edge    STOMA ASSESSMENT:   Type:  ____Ileostomy     ____Colostomy    ____Urostomy    __X unknown whether ileo or colo__     Location: RLQ    Size: ~ 1 1/2\" X 1 3/4\"   Shape: oval   Color: red   Protrudes:      ___ >1 inch               _X__ <1 inch   [place]: center and patent    Mucocutaneous Junction: intact    Skin indentations, creases or scar tissue: midline scar, not creating issue at this time, medial crease when sitting up, parastomal hernia laterally   Anna-stomal Skin Problems: denuded lateral and inferior to stoma, improved from last visit   Effluent / Flatus: Thin brown   Photo  __X__ Yes ____ No    PATIENT LAYING BACK          Pouching Procedure:   Old ostomy appliance removed using [place] medical adhesive remover spray (7131), and by pushing skin away from appliance to avoid skin tears.      Peristomal skin cleansed with: warm water and gauze, dried   Peristomal skin treated with: crusting with no sting skin prep and stoma powder (8906) x 2 to denuded skin   Ostomy appliance used:  1 piece all flexible (00652) CTF, 2\" slim paste ring (9215), lubricating deodorant (49713), Coloplast brava strips  x 2.     Patient Education: All instructions and education given through granddaughter as :     Reviewed " pouching procedure and rationale for use. Reviewed need for appropriate crusting of denuded skin. Educated patient to change appliance in 3-4 days, instead of 7, so the skin can be cleaned and crusted again. Patient verbalized understanding.     Educated patient about skin tears and plastic tape use, patient states he will stop using tape. States he did not want to use up all of his brava strips. Provided on three extra sets for home use.  Educated on measuring and purpose of hernia belt, patient declines at this time.     Patient prefers the smaller one piece (80548) rather than the larger all flexible appliance (65319).  Discussed if skin issues are resolved at next visit, will submit order to DME.     Change ileostomies every 3-5 days. Change appliance immediately if it is leaking or peristomal skin feels irritated, has itching, or  burning.   To change the appliance, remove previous appliance, cleanse peristomal skin with warm water/washcloth, pat dry, make an ostomy template or use cardboard measuring guide and trace ostomy shape onto back of barrier, cut out barrier, apply a paste ring around barrier opening and apply appliance. Empty pouches when no more than ½ full. Check contents every 2 hours or as needed. Do not leave soap residue on tissue and do not use baby wipes or skin prep wipes.     Should you experience any significant changes in your condition, such as infection (redness, swelling, localized heat, increased pain, fever > 101 F, chills) or have any questions regarding your home care instructions, please contact the wound center at [phone]. If after hours, contact your primary care physician or go to the hospital emergency room.      Verbal/demonstration instructions of above pouching procedure provided to patient/family.      Response: verbalized understanding to all instructions      PLAN: Return in one week to evaluated nikki stomal skin and efficacy of appliance    Supplies  Needed:   Appliance type:    [employer] as above             Other:      Accessories: none       Supplier: none, patient will need supply order through [place] when product selection is finalized.      Professional Collaboration:  None today.      At the time of each visit, a thorough assessment of the patient is completed to assure appropriateness of our plan of care.  The plan of care may need to be adapted from the original plan of care to address any significant changes in patient status.

## 2023-11-06 ENCOUNTER — NON-PROVIDER VISIT (OUTPATIENT)
Dept: WOUND CARE | Facility: MEDICAL CENTER | Age: 68
End: 2023-11-06
Attending: REGISTERED NURSE
Payer: MEDICARE

## 2023-11-06 PROCEDURE — 99211 OFF/OP EST MAY X REQ PHY/QHP: CPT

## 2023-11-06 NOTE — PATIENT INSTRUCTIONS
All instructions and education given through granddaughter as :      Reviewed pouching procedure and rationale for use. Reviewed need for appropriate crusting of denuded skin. Educated patient to change appliance in 3-4 days. Do not use tape at perimeter. Educated on measuring and purpose of hernia belt, patient wants his wife to buy one online off [employer]. Provided handout on stealth belt & discussed coloplast hernia belt version as well. Patient verbalized understanding.   Change ileostomies every 3-5 days. Change appliance immediately if it is leaking or peristomal skin feels irritated, has itching, or  burning.   To change the appliance, remove previous appliance, cleanse peristomal skin with warm water/washcloth, pat dry, make an ostomy template or use cardboard measuring guide and trace ostomy shape onto back of barrier, cut out barrier, apply a paste ring around barrier opening and apply appliance. Empty pouches when no more than ½ full. Check contents every 2 hours or as needed. Do not leave soap residue on tissue and do not use baby wipes or skin prep wipes.      Should you experience any significant changes in your condition, such as infection (redness, swelling, localized heat, increased pain, fever > 101 F, chills) or have any questions regarding your home care instructions, please contact the wound center at [phone]. If after hours, contact your primary care physician or go to the hospital emergency room.    Call [facility] on the handout order form provided to follow up with your order status in 2 days.

## 2023-11-06 NOTE — CERTIFICATION
"Advanced Wound Care  [facility]  [address]  [place] [address]  [phone] Fax#    Ostomy Progress Note  For 90 Day Certification Period:  10/21/2023-1/21/2024    Referring Provider: Leeanne Gruber D.O.   Primary Provider: Lenora Ragsdale M.D.  Consulting Providers: none today  Surgeon: unknown, ostomy creation in [place], 3 years ago      Start of Care: 07/21/2023    Reason for referral: Patient with established ostomy needing assistance with ordering of supplies and trouble shooting issues      SUBJECTIVE: \"I change it every 3 days.\" Via granddaughter translating    HPI: Patient is a 67 y/o male with history of ostomy creation. Patient unfortunately is a poor historian and states that he recently moved here from [place] and per granddaughter at bedside, he has been getting supplies from [facility]. Patient was referred to help order ostomy supplies through DME. Patient can only state that he had the ostomy created due to a hernia, however, there is a parastomal hernia present upon assessment. Patient also cannot say whether he had a colostomy or ileostomy created.     Past Medical Hx:  No past medical history on file.     Surgical Hx:  No past surgical history on file.     Medications:  Current Outpatient Medications on File Prior to Visit   Medication Sig Dispense Refill    Blood Glucose Test Strips Use one Accucheck Guide strip to test blood sugar once daily early morning before first meal. 100 Strip 0    enalapril (VASOTEC) 10 MG Tab Take 1 Tablet by mouth every day. 90 Tablet 0    insulin glargine 100 UNIT/ML SC SOPN injection Inject 15 Units under the skin every evening. 5 Each 0    Insulin Pen Needle 32 G x 4 mm (BD PEN NEEDLE RANDY 2ND GEN) USE 1  ONCE DAILY 100 Each 2    Lancets Use one Accucheck Guide lancet to test blood sugar once daily early morning before first meal. 100 Each 0    amLODIPine (NORVASC) 5 MG Tab Take 5 mg by mouth every day. for 30 days      " "Alcohol Swabs Wipe site with prep pad prior to injection. 100 Each 0    Blood Glucose Meter Kit Test blood sugar as recommended by provider. Accucheck Guide blood glucose monitoring kit. 1 Kit 0     No current facility-administered medications on file prior to visit.      Allergies:  No Known Allergies     Social Hx:  Social History     Socioeconomic History    Marital status: Single   Tobacco Use    Smoking status: Never    Smokeless tobacco: Never   Vaping Use    Vaping Use: Never used   Substance and Sexual Activity    Alcohol use: Never    Drug use: Never        OBJECTIVE: 1 piece all flexible appliance in place with plastic tape border to lateral edge and Brava strip to medial edge    STOMA ASSESSMENT:   Type:  ____Ileostomy     ____Colostomy    ____Urostomy    __X unknown whether ileo or colo__     Location: RLQ    Size: ~ 1 1/4\" X 1 3/4\"   Shape: oval   Color: red   Protrudes:      ___ >1 inch               _X__ <1 inch   [place]: center and patent    Mucocutaneous Junction: intact    Skin indentations, creases or scar tissue: midline scar, not creating issue at this time, medial crease when sitting up, parastomal hernia laterally   Anna-stomal Skin Problems: scant redness    Effluent / Flatus: soft brown   Photo  ____ Yes __x__ No    Pouching Procedure:   Old ostomy appliance removed using [place] medical adhesive remover spray (7629), and by pushing skin away from appliance to avoid skin tears.      Peristomal skin cleansed with: warm water and gauze, dried   Peristomal skin treated with: crusting with no sting skin prep and nystatin   Ostomy appliance used:  1 piece all flexible (61625) CTF, 2\" slim paste ring (1315), lubricating deodorant (32941), Coloplast brava strips  x 2.     Patient Education: All instructions and education given through granddaughter as :     Reviewed pouching procedure and rationale for use. Reviewed need for appropriate crusting of denuded skin. Educated patient to " change appliance in 3-4 days. Do not use tape at perimeter. Educated on measuring and purpose of hernia belt, patient wants his wife to buy one online off [employer]. Provided handout on stealth belt & discussed coloplast hernia belt version as well. Patient verbalized understanding.   Change ileostomies every 3-5 days. Change appliance immediately if it is leaking or peristomal skin feels irritated, has itching, or  burning.   To change the appliance, remove previous appliance, cleanse peristomal skin with warm water/washcloth, pat dry, make an ostomy template or use cardboard measuring guide and trace ostomy shape onto back of barrier, cut out barrier, apply a paste ring around barrier opening and apply appliance. Empty pouches when no more than ½ full. Check contents every 2 hours or as needed. Do not leave soap residue on tissue and do not use baby wipes or skin prep wipes.     Should you experience any significant changes in your condition, such as infection (redness, swelling, localized heat, increased pain, fever > 101 F, chills) or have any questions regarding your home care instructions, please contact the wound center at [phone]. If after hours, contact your primary care physician or go to the hospital emergency room.      Verbal/demonstration instructions of above pouching procedure provided to patient/family.      Response: verbalized understanding to all instructions      PLAN: PRN if denuded peristomal skin returns or if patient would like assistance in ordering & measuring for hernia belt    Supplies Needed:   Appliance type:    Jacquie as above             Other:      Accessories: none       Supplier: [employer]      Professional Collaboration:  Supply ordered from [employer]      At the time of each visit, a thorough assessment of the patient is completed to assure appropriateness of our plan of care.  The plan of care may need to be adapted from the original plan of care to address any significant  changes in patient status.

## 2024-01-02 DIAGNOSIS — I10 ESSENTIAL HYPERTENSION: ICD-10-CM

## 2024-01-02 DIAGNOSIS — E11.9 TYPE 2 DIABETES MELLITUS WITHOUT COMPLICATION, WITH LONG-TERM CURRENT USE OF INSULIN (HCC): ICD-10-CM

## 2024-01-02 DIAGNOSIS — Z79.4 TYPE 2 DIABETES MELLITUS WITHOUT COMPLICATION, WITH LONG-TERM CURRENT USE OF INSULIN (HCC): ICD-10-CM

## 2024-01-08 RX ORDER — ENALAPRIL MALEATE 10 MG/1
10 TABLET ORAL DAILY
Qty: 90 TABLET | Refills: 0 | Status: SHIPPED | OUTPATIENT
Start: 2024-01-08 | End: 2024-03-07 | Stop reason: SDUPTHER

## 2024-02-22 ENCOUNTER — NON-PROVIDER VISIT (OUTPATIENT)
Dept: WOUND CARE | Facility: MEDICAL CENTER | Age: 69
End: 2024-02-22
Payer: MEDICARE

## 2024-02-22 ENCOUNTER — HOSPITAL ENCOUNTER (OUTPATIENT)
Dept: LAB | Facility: MEDICAL CENTER | Age: 69
End: 2024-02-22
Payer: MEDICARE

## 2024-02-22 ENCOUNTER — OFFICE VISIT (OUTPATIENT)
Dept: MEDICAL GROUP | Facility: CLINIC | Age: 69
End: 2024-02-22
Payer: MEDICARE

## 2024-02-22 VITALS
TEMPERATURE: 97 F | DIASTOLIC BLOOD PRESSURE: 69 MMHG | HEIGHT: 65 IN | RESPIRATION RATE: 16 BRPM | WEIGHT: 150 LBS | SYSTOLIC BLOOD PRESSURE: 129 MMHG | BODY MASS INDEX: 24.99 KG/M2 | OXYGEN SATURATION: 99 % | HEART RATE: 90 BPM

## 2024-02-22 DIAGNOSIS — Z79.4 TYPE 2 DIABETES MELLITUS WITHOUT COMPLICATION, WITH LONG-TERM CURRENT USE OF INSULIN (HCC): ICD-10-CM

## 2024-02-22 DIAGNOSIS — E11.9 TYPE 2 DIABETES MELLITUS WITHOUT COMPLICATION, WITH LONG-TERM CURRENT USE OF INSULIN (HCC): ICD-10-CM

## 2024-02-22 DIAGNOSIS — Z43.3 COLOSTOMY CARE (HCC): ICD-10-CM

## 2024-02-22 LAB
ALBUMIN SERPL BCP-MCNC: 4.1 G/DL (ref 3.2–4.9)
ALBUMIN/GLOB SERPL: 1.1 G/DL
ALP SERPL-CCNC: 83 U/L (ref 30–99)
ALT SERPL-CCNC: 22 U/L (ref 2–50)
ANION GAP SERPL CALC-SCNC: 13 MMOL/L (ref 7–16)
AST SERPL-CCNC: 20 U/L (ref 12–45)
BASOPHILS # BLD AUTO: 0.2 % (ref 0–1.8)
BASOPHILS # BLD: 0.02 K/UL (ref 0–0.12)
BILIRUB SERPL-MCNC: 0.5 MG/DL (ref 0.1–1.5)
BUN SERPL-MCNC: 24 MG/DL (ref 8–22)
CALCIUM ALBUM COR SERPL-MCNC: 9.4 MG/DL (ref 8.5–10.5)
CALCIUM SERPL-MCNC: 9.5 MG/DL (ref 8.5–10.5)
CHLORIDE SERPL-SCNC: 99 MMOL/L (ref 96–112)
CO2 SERPL-SCNC: 21 MMOL/L (ref 20–33)
CREAT SERPL-MCNC: 1.4 MG/DL (ref 0.5–1.4)
CREAT UR-MCNC: 55.77 MG/DL
EOSINOPHIL # BLD AUTO: 0.03 K/UL (ref 0–0.51)
EOSINOPHIL NFR BLD: 0.4 % (ref 0–6.9)
ERYTHROCYTE [DISTWIDTH] IN BLOOD BY AUTOMATED COUNT: 43.5 FL (ref 35.9–50)
EST. AVERAGE GLUCOSE BLD GHB EST-MCNC: 369 MG/DL
GFR SERPLBLD CREATININE-BSD FMLA CKD-EPI: 55 ML/MIN/1.73 M 2
GLOBULIN SER CALC-MCNC: 3.6 G/DL (ref 1.9–3.5)
GLUCOSE SERPL-MCNC: 412 MG/DL (ref 65–99)
HBA1C MFR BLD: 14.5 % (ref 4–5.6)
HCT VFR BLD AUTO: 47.5 % (ref 42–52)
HGB BLD-MCNC: 15.6 G/DL (ref 14–18)
IMM GRANULOCYTES # BLD AUTO: 0.03 K/UL (ref 0–0.11)
IMM GRANULOCYTES NFR BLD AUTO: 0.4 % (ref 0–0.9)
LYMPHOCYTES # BLD AUTO: 1.09 K/UL (ref 1–4.8)
LYMPHOCYTES NFR BLD: 13.4 % (ref 22–41)
MCH RBC QN AUTO: 30.9 PG (ref 27–33)
MCHC RBC AUTO-ENTMCNC: 32.8 G/DL (ref 32.3–36.5)
MCV RBC AUTO: 94.1 FL (ref 81.4–97.8)
MICROALBUMIN UR-MCNC: 7.9 MG/DL
MICROALBUMIN/CREAT UR: 142 MG/G (ref 0–30)
MONOCYTES # BLD AUTO: 0.56 K/UL (ref 0–0.85)
MONOCYTES NFR BLD AUTO: 6.9 % (ref 0–13.4)
NEUTROPHILS # BLD AUTO: 6.38 K/UL (ref 1.82–7.42)
NEUTROPHILS NFR BLD: 78.7 % (ref 44–72)
NRBC # BLD AUTO: 0 K/UL
NRBC BLD-RTO: 0 /100 WBC (ref 0–0.2)
PLATELET # BLD AUTO: 228 K/UL (ref 164–446)
PMV BLD AUTO: 11.4 FL (ref 9–12.9)
POTASSIUM SERPL-SCNC: 4.4 MMOL/L (ref 3.6–5.5)
PROT SERPL-MCNC: 7.7 G/DL (ref 6–8.2)
RBC # BLD AUTO: 5.05 M/UL (ref 4.7–6.1)
SODIUM SERPL-SCNC: 133 MMOL/L (ref 135–145)
WBC # BLD AUTO: 8.1 K/UL (ref 4.8–10.8)

## 2024-02-22 PROCEDURE — 82043 UR ALBUMIN QUANTITATIVE: CPT

## 2024-02-22 PROCEDURE — 82570 ASSAY OF URINE CREATININE: CPT

## 2024-02-22 PROCEDURE — 3074F SYST BP LT 130 MM HG: CPT

## 2024-02-22 PROCEDURE — 83036 HEMOGLOBIN GLYCOSYLATED A1C: CPT | Mod: GA

## 2024-02-22 PROCEDURE — 80053 COMPREHEN METABOLIC PANEL: CPT

## 2024-02-22 PROCEDURE — 3078F DIAST BP <80 MM HG: CPT

## 2024-02-22 PROCEDURE — 99211 OFF/OP EST MAY X REQ PHY/QHP: CPT

## 2024-02-22 PROCEDURE — 99213 OFFICE O/P EST LOW 20 MIN: CPT | Mod: GE

## 2024-02-22 PROCEDURE — 36415 COLL VENOUS BLD VENIPUNCTURE: CPT | Mod: GA

## 2024-02-22 PROCEDURE — 84443 ASSAY THYROID STIM HORMONE: CPT

## 2024-02-22 PROCEDURE — 85025 COMPLETE CBC W/AUTO DIFF WBC: CPT

## 2024-02-22 RX ORDER — ATORVASTATIN CALCIUM 20 MG/1
20 TABLET, FILM COATED ORAL DAILY
COMMUNITY
Start: 2024-01-04 | End: 2024-03-12

## 2024-02-22 RX ORDER — INSULIN GLARGINE 100 [IU]/ML
15 INJECTION, SOLUTION SUBCUTANEOUS
COMMUNITY

## 2024-02-22 ASSESSMENT — FIBROSIS 4 INDEX: FIB4 SCORE: 1.38

## 2024-02-22 NOTE — CERTIFICATION
Ostomy Evaluation  For 90 Day Certification Period:  02/22/24  - 05/22/24     Surgeon: Unknown, ostomy creation in [place] 4 years ago  Surgery type and date: Ostomy creation due to a hernia.  Temporary vs. Permanent - unclear    Start of Care: 02/22/2024    Supplier: [employer]  Order date: 02/22/24    OBJECTIVE: 1 piece soft flex appliance in place with no leaking.     STOMA ASSESSMENT:   Ileostomy Standard (Naomi, kezia) RLQ (Active)   Wound Image   02/22/24 1500   Stomal Appliance Assessment Changed 02/22/24 1500   Stoma Assessment Beefy red 02/22/24 1500   Stoma Shape Budded Greater Than One Inch;Oval 02/22/24 1500   Mucocutaneous Junction Intact 02/22/24 1500   Treatment Appliance Changed;Cleansed with water/washcloth;Removed appliance with adhesive remover 02/22/24 1500   Peristomal Protectant Hydrocolloid 02/22/24 1500   Appliance (Pouch) # [id number], [id number] 02/22/24 1500   Appliance Brand [place] 02/22/24 1500   Appliance Supplier Other (Comments) 02/22/24 1500        Pouching Procedure Notes (if indicated): Piece of a brava strip to lateral inferior skin tear; 1 piece 63903 with brava strips x 2 to outer edges.    1 Can do independently   2 Needs some help   3 Needs a lot of help and additional review   4 No chance to review, needs additional follow-up       EMPTY THE POUCH  Empty pouch when it is 1/3 - ½ full 1   Assemble supplies before emptying: toilet paper, pouch, deodorant  1   Assume a correct position 1   Open pouch 1   Lower opening into the toilet and empty 1   Wipe opening before resealing 1   Add pouch deodorant (if used) 1   Reclamp or reseal the pouch 1     REMOVING THE OLD POUCH  Assemble supplies for pouch change: new pouch, towel, measurement guide, pen, scissors, garbage bag (skin barrier ring, powder, deodorant, and adhesive remover, if needed) 1   Remove the outer adhesive by starting at one corner/edge 1   Place one hand on skin and push down while your other hand lifts the barrier.  Use a warm wet cloth or adhesive releaser if needed 1   Place the old pouch in a garbage bag 1   If you are using a clamp, do not throw it away 1     CLEAN AND INSPECT THE SKIN  Check the skin for color, bleeding, and irritation 1   Clean skin around the stoma with warm water 1   Pat the skin dry 1       Crusting if needed    Apply stoma powder to red/wet skin, brush off excess 1   Apply skin protectant over powder ONLY to seal in place 1     MEASURE AND CUT OPENING  Cover the stoma powder with a piece of tissue or gauze while measuring 1   Measure the stoma accurately with the measurement guide 1   Trace the correct size on the back of the pouch barrier 1   Place your finger into the precut opening and push the pouch away from the barrier in using a one-piece system 1   Cut the traced opening 1   Center the new opening over the stoma making sure it is close to the stoma edge. Adjust as needed.  1   Colostomy - 1/8” clearance between stoma and appliance (width of a nickel on its side)  Ileostomy/Urostomy - 1/16” clearance between stoma and appliance (width of a dime on its side) 1     APPLY NEW POUCHING SYSTEM  Remove the paper backing from the pouch barrier 1   Crust if needed 1   Remove any gauze/tissue placed over stoma 1   Center and apply the pouch skin barrier around the stoma. Starting closest to the stoma, press on the barrier for 30-60 seconds 1   For a two-piece system, apply the pouch to the barrier flange 1   Close the bottom of the pouch 1       PLAN/GOALS:  Patient is independent with ostomy care. He really needs supplies. Will order from [employer] today.  2.   Will come back in a week to ensure he has received his supplies and then in a month to ensure everything is still working well for him.   3.   He established with a PCP, likely will be receiving a referral soon for a surgical consult to see if he is a candidate for reversal.        WOUND PROCEDURE NOTE (if indicated): NA

## 2024-02-22 NOTE — PATIENT INSTRUCTIONS
-Change urostomies and ileostomies every 3-5 days. Change colostomies every 5-7 days. Change appliance immediately if it is leaking or peristomal skin feels irritated, has itching, or  burning.   To change the appliance, remove previous appliance, cleanse peristomal skin with warm water/washcloth, pat dry, make an ostomy template or use cardboard measuring guide and trace ostomy shape onto back of barrier, cut out barrier, apply a paste ring around barrier opening and apply appliance. Empty pouches when no more than ½ full. Check contents every 2 hours or as needed. Do not leave soap residue on tissue and do not use baby wipes or skin prep wipes.     -Should you experience any significant changes in your wound(s), such as infection (redness, swelling, localized heat, increased pain, fever > 101 F, chills) or have any questions regarding your home care instructions, please contact the wound center at [phone]. If after hours, contact your primary care physician or go to the hospital emergency room.

## 2024-02-22 NOTE — ASSESSMENT & PLAN NOTE
Afebrile, vital signs stable.  Last A1c 10.9 on 6/2023.  Medications prescribed are metformin 500 mg twice daily, Lantus 15 units and Glargine 15 units every evening.  Patient has been noncompliant with medications.  PE: Cards regular rate and rhythm no murmur appreciated, pulm clear to auscultation bilaterally no wheezing.  POC A1c attempted however unable to assess value.    Plan  - Labs ordered: CBC, CMP, A1c, microalbumin creatinine ratio, TSH  - Will continue metformin 500 mg twice daily.  Will hold off on insulin injections at this time given patient has not had insulin for over 3 months.  - Patient is currently on ACE inhibitor and statin.  - Will complete retinal scan and monofilament test at next appointment.  - Return precautions given.  Discussed signs and symptoms that would facilitate ED visit.  - Patient will follow-up in 2 weeks.  Will evaluate patient's A1c and determine appropriate medication management.

## 2024-02-22 NOTE — LETTER
[facility]  Lenora Ragsdale M.D.  [address]  [place] [address]  Fax: [phone]   Authorization for Release/Disclosure of   Protected Health Information   Name: PAULIE PEREA : 1955 SSN: [id number]   Address: [address]  [place] [address] Phone:    [phone] (home)    I authorize the entity listed below to release/disclose the PHI below to:   [facility]/Lenora Ragsdale M.D. and Shante Garcia M.D.   Provider or Entity Name:     Address   City, State, [facility]   Phone:      Fax:        Reason for request: continuity of care   Information to be released:    [ X ] LAST COLONOSCOPY,  including any PATH REPORT and follow-up  [ X ] LAST FIT/COLOGUARD RESULT [  ] LAST DEXA  [  ] LAST MAMMOGRAM  [  ] LAST PAP  [  ] LAST LABS [  ] RETINA EXAM REPORT  [  ] IMMUNIZATION RECORDS  [  ] Release all info      [  ] Check here and initial the line next to each item to release ALL health information INCLUDING  _____ Care and treatment for drug and / or alcohol abuse  _____ HIV testing, infection status, or AIDS  _____ Genetic Testing    DATES OF SERVICE OR TIME PERIOD TO BE DISCLOSED: _____________  I understand and acknowledge that:  * This Authorization may be revoked at any time by you in writing, except if your health information has already been used or disclosed.  * Your health information that will be used or disclosed as a result of you signing this authorization could be re-disclosed by the recipient. If this occurs, your re-disclosed health information may no longer be protected by State or Federal laws.  * You may refuse to sign this Authorization. Your refusal will not affect your ability to obtain treatment.  * This Authorization becomes effective upon signing and will  on (date) __________.      If no date is indicated, this Authorization will  one (1) year from the signature date.    Name: Paulie Perea    Signature:   Date:     2024       PLEASE FAX REQUESTED  RECORDS BACK TO: [phone]

## 2024-02-22 NOTE — PROGRESS NOTES
CC:Diagnoses of Type 2 diabetes mellitus without complication, with long-term current use of insulin ([facility]) and Colostomy care ([facility]) were pertinent to this visit.    HISTORY OF PRESENT ILLNESS: Patient is a 68 y.o. male established patient who presents today for the following:    Problem   Type 2 Diabetes Mellitus Without Complication, With Long-Term Current Use of Insulin ([facility])    Patient with history of diabetes was previously on metformin and insulin.  Patient is accompanied by his son who translated due to the patient speaking Trin[place].  Son reported the following: Patient's daughter was managing his care until a couple months ago when she told her brother she can do it anymore.  Daughter was giving patient injections which she has not received since December.  Patient is a poor historian and is uncertain what medications he is taking.  Denies any headache, dizziness, lightheadedness, increased thirst, shortness of breath, chest pain, abdominal pain or polyuria.     Colostomy Care ([facility])    Presents to clinic with his son to help translate since patient speaks Trin[place].  History according to son: Patient had a surgery 3 years ago in [place] and an ostomy bag was placed.  Unclear if ileostomy or colostomy in nature.  Patient's son said that his sister told him he could be removed Sunday.  Patient denies any issues with ostomy site but he is going to run out of supplies in the next week.  Output is pasty in consistency.  Denies any fever, chills, weakness, dizziness, erythema around the site or pain.          Patient Active Problem List    Diagnosis Date Noted    Chronic kidney disease (CKD) 06/20/2023    History of alcohol abuse 06/20/2023    Type 2 diabetes mellitus without complication, with long-term current use of insulin ([facility]) 02/07/2023    Essential hypertension 02/07/2023    Colostomy care ([facility]) 02/07/2023        Allergies:Patient has no known allergies.    Current Outpatient Medications   Medication  "Sig Dispense Refill    insulin glargine (LANTUS) 100 UNIT/ML SC SOLN Inject 15 Units under the skin.      atorvastatin (LIPITOR) 20 MG Tab Take 20 mg by mouth every day.      enalapril (VASOTEC) 10 MG Tab Take 1 tablet by mouth once daily 90 Tablet 0    metFORMIN (GLUCOPHAGE) 500 MG Tab TAKE 1 TABLET BY MOUTH TWICE DAILY WITH MEALS 180 Tablet 0    Blood Glucose Test Strips Use one Accucheck Guide strip to test blood sugar once daily early morning before first meal. 100 Strip 0    insulin glargine 100 UNIT/ML SC SOPN injection Inject 15 Units under the skin every evening. 5 Each 0    Insulin Pen Needle 32 G x 4 mm (BD PEN NEEDLE RANDY 2ND GEN) USE 1  ONCE DAILY 100 Each 2    Lancets Use one Accucheck Guide lancet to test blood sugar once daily early morning before first meal. 100 Each 0    amLODIPine (NORVASC) 5 MG Tab Take 5 mg by mouth every day. for 30 days      Alcohol Swabs Wipe site with prep pad prior to injection. 100 Each 0    Blood Glucose Meter Kit Test blood sugar as recommended by provider. Accucheck Guide blood glucose monitoring kit. 1 Kit 0     No current facility-administered medications for this visit.       Social History     Tobacco Use    Smoking status: Never    Smokeless tobacco: Never   Vaping Use    Vaping Use: Never used   Substance Use Topics    Alcohol use: Never    Drug use: Never     Social History     Social History Narrative    Not on file       History reviewed. No pertinent family history.    Exam:    /69 (BP Location: Left arm, Patient Position: Sitting, BP Cuff Size: Adult)   Pulse 90   Temp 36.1 °C (97 °F) (Temporal)   Resp 16   Ht 1.638 m (5' 4.5\")   Wt 68 kg (150 lb)   SpO2 99%  Body mass index is 25.35 kg/m².    General:  Well nourished, well developed male in NAD  HENT: Atraumatic, normocephalic  EYES: Extraocular movements intact, pupils equal and reactive to light  NECK: Supple, FROM  CHEST: No deformities, Equal chest expansion  RESP: Unlabored, no stridor or " audible wheeze  ABD: Non-Distended, large surgical scar midline with ostomy bag in right lower quadrant without surrounding erythema or tenderness to palpation  Extremities: No Clubbing, Cyanosis, or Edema  Skin: Warm/dry, without rashes  Neuro: A/O x 4, CN 2-12 Grossly intact, Motor/sensory grossly intact  Psych: Normal behavior, normal affect    LABS: Results reviewed and discussed with the patient, questions answered.   Latest Reference Range & Units 02/07/23 09:10 06/05/23 15:55 06/07/23 11:01   Glycohemoglobin 4.0 - 5.6 % 15 ! 10.8 ! 10.9 (H) (E)   !: Data is abnormal  (H): Data is abnormally high  (E): External lab result    ASSESSMENT/PLAN:    Colostomy care ([facility])  Afebrile, vital signs stable.  History of ostomy 3 years ago in [place].  Unclear if ileostomy or colostomy.  Patient last seen November/2023 by wound care for ostomy management and supplies.  Output feels pasty, however unable to appreciate given opaque bag.  PE: Abdomen: Large surgical scar midline with ostomy bag in right lower quadrant with no surrounding erythema and no tenderness to palpation.    Plan  - Patient will follow-up with wound care for ostomy management and supplies  - Release of records signed to get more information on patient's surgery performed 3 years ago  - Return precautions given.  Discussed signs and symptoms that would facilitate an ED visit.  - Follow-up appointment in 2 weeks    Type 2 diabetes mellitus without complication, with long-term current use of insulin ([facility])  Afebrile, vital signs stable.  Last A1c 10.9 on 6/2023.  Medications prescribed are metformin 500 mg twice daily, Lantus 15 units and Glargine 15 units every evening.  Patient has been noncompliant with medications.  PE: Cards regular rate and rhythm no murmur appreciated, pulm clear to auscultation bilaterally no wheezing.  POC A1c attempted however unable to assess value.    Plan  - Labs ordered: CBC, CMP, A1c, microalbumin creatinine ratio, TSH  -  Will continue metformin 500 mg twice daily.  Will hold off on insulin injections at this time given patient has not had insulin for over 3 months.  - Patient is currently on ACE inhibitor and statin.  - Will complete retinal scan and monofilament test at next appointment.  - Return precautions given.  Discussed signs and symptoms that would facilitate ED visit.  - Patient will follow-up in 2 weeks.  Will evaluate patient's A1c and determine appropriate medication management.      Return in about 2 weeks (around 3/7/2024).    Shante Cartwright MD PGY2

## 2024-02-22 NOTE — PATIENT INSTRUCTIONS
[facility]  [facility]  [address] [place] [address], [address]  [place] [address]  [phone] Fax#  Patient to follow up with for ostomy supplies

## 2024-02-22 NOTE — ASSESSMENT & PLAN NOTE
Afebrile, vital signs stable.  History of ostomy 3 years ago in [place].  Unclear if ileostomy or colostomy.  Patient last seen November/2023 by wound care for ostomy management and supplies.  Output feels pasty, however unable to appreciate given opaque bag.  PE: Abdomen: Large surgical scar midline with ostomy bag in right lower quadrant with no surrounding erythema and no tenderness to palpation.    Plan  - Patient will follow-up with wound care for ostomy management and supplies  - Release of records signed to get more information on patient's surgery performed 3 years ago  - Return precautions given.  Discussed signs and symptoms that would facilitate an ED visit.  - Follow-up appointment in 2 weeks

## 2024-02-23 LAB — TSH SERPL DL<=0.005 MIU/L-ACNC: 1.26 UIU/ML (ref 0.38–5.33)

## 2024-02-29 ENCOUNTER — NON-PROVIDER VISIT (OUTPATIENT)
Dept: WOUND CARE | Facility: MEDICAL CENTER | Age: 69
End: 2024-02-29
Payer: MEDICARE

## 2024-02-29 PROCEDURE — 99211 OFF/OP EST MAY X REQ PHY/QHP: CPT

## 2024-02-29 NOTE — PATIENT INSTRUCTIONS
Change colostomies every 5-7 days. Change appliance immediately if it is leaking or peristomal skin feels irritated, has itching, or  burning.   To change the appliance, remove previous appliance, cleanse peristomal skin with warm water/washcloth, pat dry, make an ostomy template or use cardboard measuring guide and trace ostomy shape onto back of barrier, cut out barrier, apply a paste ring around barrier opening and apply appliance. Empty pouches when no more than ½ full. Check contents every 2 hours or as needed. Do not leave soap residue on tissue and do not use baby wipes or skin prep wipes.     Should you experience any significant changes in your condition, such as infection (redness, swelling, localized heat, increased pain, fever > 101 F, chills) or have any questions regarding your home care instructions, please contact the wound center at [phone]. If after hours, contact your primary care physician or go to the hospital emergency room.

## 2024-02-29 NOTE — WOUND TEAM
Ostomy Evaluation  For 90 Day Certification Period:  02/22/24  - 05/29/24     Surgeon: Unknown, ostomy creation in [place] 4 years ago  Surgery type and date: Ostomy creation due to a hernia.  Temporary vs. Permanent - unclear    Start of Care: 02/22/2024    Supplier: [employer]  Order date: 02/22/24    OBJECTIVE: 1 piece soft flex appliance in place with no leaking, Brava XL strips to inferior and superior.     STOMA ASSESSMENT:      Ileostomy Standard (Naomi, kezia) RLQ (Active)   Wound Image   02/29/24 1000   Stomal Appliance Assessment Intact 02/29/24 1000   Stoma Assessment Beefy red 02/22/24 1500   Stoma Shape Budded Greater Than One Inch;Oval 02/22/24 1500   Mucocutaneous Junction Intact 02/22/24 1500   Treatment Appliance Changed;Cleansed with water/washcloth;Removed appliance with adhesive remover 02/22/24 1500   Peristomal Protectant Hydrocolloid 02/22/24 1500   Appliance (Pouch) # [id number], [id number] 02/29/24 1000   Appliance Brand [employer] 02/29/24 1000   Appliance Supplier Other (Comments) 02/22/24 1500              Pouching Procedure Notes (if indicated): Patient requests no appliance change today, he changed it yesterday.     Patient Education:     1 Can do independently   2 Needs some help   3 Needs a lot of help and additional review   4 No chance to review, needs additional follow-up       EMPTY THE POUCH  Empty pouch when it is 1/3 - ½ full 1   Assemble supplies before emptying: toilet paper, pouch, deodorant  1   Assume a correct position 1   Open pouch 1   Lower opening into the toilet and empty 1   Wipe opening before resealing 1   Add pouch deodorant (if used) 1   Reclamp or reseal the pouch 1     REMOVING THE OLD POUCH  Assemble supplies for pouch change: new pouch, towel, measurement guide, pen, scissors, garbage bag (skin barrier ring, powder, deodorant, and adhesive remover, if needed) 1   Remove the outer adhesive by starting at one corner/edge 1   Place one hand on skin and push down  while your other hand lifts the barrier. Use a warm wet cloth or adhesive releaser if needed 1   Place the old pouch in a garbage bag 1   If you are using a clamp, do not throw it away 1     CLEAN AND INSPECT THE SKIN  Check the skin for color, bleeding, and irritation 1   Clean skin around the stoma with warm water 1   Pat the skin dry 1       Crusting if needed    Apply stoma powder to red/wet skin, brush off excess 1   Apply skin protectant over powder ONLY to seal in place 1     MEASURE AND CUT OPENING  Cover the stoma powder with a piece of tissue or gauze while measuring 1   Measure the stoma accurately with the measurement guide 1   Trace the correct size on the back of the pouch barrier 1   Place your finger into the precut opening and push the pouch away from the barrier in using a one-piece system 1   Cut the traced opening 1   Center the new opening over the stoma making sure it is close to the stoma edge. Adjust as needed.  1   Colostomy - 1/8” clearance between stoma and appliance (width of a nickel on its side)  Ileostomy/Urostomy - 1/16” clearance between stoma and appliance (width of a dime on its side) 1     APPLY NEW POUCHING SYSTEM  Remove the paper backing from the pouch barrier 1   Crust if needed 1   Remove any gauze/tissue placed over stoma 1   Center and apply the pouch skin barrier around the stoma. Starting closest to the stoma, press on the barrier for 30-60 seconds 1   For a two-piece system, apply the pouch to the barrier flange 1   Close the bottom of the pouch 1       PLAN/GOALS:  Patient is independent with ostomy care, dropping to [facility] for clinic.   2.   Supplies received, only concern is that he was sent regular brava strips instead of XL strips.   3.   No reversal surgery scheduled yet      WOUND PROCEDURE NOTE (if indicated): NA

## 2024-03-07 ENCOUNTER — OFFICE VISIT (OUTPATIENT)
Dept: MEDICAL GROUP | Facility: CLINIC | Age: 69
End: 2024-03-07
Payer: MEDICARE

## 2024-03-07 VITALS
HEIGHT: 65 IN | BODY MASS INDEX: 25.33 KG/M2 | WEIGHT: 152 LBS | DIASTOLIC BLOOD PRESSURE: 60 MMHG | SYSTOLIC BLOOD PRESSURE: 120 MMHG | TEMPERATURE: 97.2 F | HEART RATE: 79 BPM | RESPIRATION RATE: 18 BRPM | OXYGEN SATURATION: 96 %

## 2024-03-07 DIAGNOSIS — N18.31 STAGE 3A CHRONIC KIDNEY DISEASE: ICD-10-CM

## 2024-03-07 DIAGNOSIS — Z79.4 TYPE 2 DIABETES MELLITUS WITHOUT COMPLICATION, WITH LONG-TERM CURRENT USE OF INSULIN (HCC): ICD-10-CM

## 2024-03-07 DIAGNOSIS — E11.9 TYPE 2 DIABETES MELLITUS WITHOUT COMPLICATION, WITH LONG-TERM CURRENT USE OF INSULIN (HCC): ICD-10-CM

## 2024-03-07 PROCEDURE — 3074F SYST BP LT 130 MM HG: CPT

## 2024-03-07 PROCEDURE — 3078F DIAST BP <80 MM HG: CPT

## 2024-03-07 PROCEDURE — 99214 OFFICE O/P EST MOD 30 MIN: CPT | Mod: GC

## 2024-03-07 RX ORDER — GLUCOSAMINE HCL/CHONDROITIN SU 500-400 MG
CAPSULE ORAL
Qty: 100 EACH | Refills: 0 | Status: SHIPPED | OUTPATIENT
Start: 2024-03-07

## 2024-03-07 RX ORDER — LANCETS 30 GAUGE
EACH MISCELLANEOUS
Qty: 100 EACH | Refills: 0 | Status: SHIPPED | OUTPATIENT
Start: 2024-03-07

## 2024-03-07 RX ORDER — ENALAPRIL MALEATE 10 MG/1
10 TABLET ORAL DAILY
Qty: 90 TABLET | Refills: 0 | Status: SHIPPED | OUTPATIENT
Start: 2024-03-07

## 2024-03-07 RX ORDER — PEN NEEDLE, DIABETIC 32GX 5/32"
NEEDLE, DISPOSABLE MISCELLANEOUS
Qty: 100 EACH | Refills: 2 | Status: SHIPPED | OUTPATIENT
Start: 2024-03-07

## 2024-03-07 ASSESSMENT — FIBROSIS 4 INDEX: FIB4 SCORE: 1.23

## 2024-03-07 NOTE — PROGRESS NOTES
CC:Diagnoses of Type 2 diabetes mellitus without complication, with long-term current use of insulin (HCC), Stage 3a chronic kidney disease (HCC), and Essential hypertension were pertinent to this visit.    HISTORY OF PRESENT ILLNESS: Patient is a 68 y.o. male established patient who presents today for the following:    Problem   Type 2 Diabetes Mellitus Without Complication, With Long-Term Current Use of Insulin ([facility])    Last Visit:   2/22/2024  Patient with history of diabetes was previously on metformin and insulin.  Patient is accompanied by his son who translated due to the patient speaking [place].  Son reported the following: Patient's daughter was managing his care until a couple months ago when she told her brother she can do it anymore.  Daughter was giving patient injections which she has not received since December.  Patient is a poor historian and is uncertain what medications he is taking.  Denies any headache, dizziness, lightheadedness, increased thirst, shortness of breath, chest pain, abdominal pain or polyuria.    Today's Visit:  3/7/2024  Patient presents today for follow-up on diabetes management.  Son is present during this visit to help with translation as patient speaks [place].  Patient brought his pills that he had been taking which consists of metformin 500 mg twice daily, enalapril 10 mg daily and atorvastatin 20 mg daily.  Patient has not been taking his insulin.  Patient believes that taking these pills will help cure him of his diabetes and he does not need insulin.  Patient continues to have a poor diet drinking lots of sugary beverages and eating simple carbs.  Patient denies any headache, change in vision, shortness of breath, chest pain, nausea, vomiting, abdominal pain, diarrhea, dysuria, polyuria.        Patient Active Problem List    Diagnosis Date Noted    Chronic kidney disease (CKD) 06/20/2023    History of alcohol abuse 06/20/2023    Type 2 diabetes mellitus without  "complication, with long-term current use of insulin ([facility]) 02/07/2023    Essential hypertension 02/07/2023    Colostomy care ([facility]) 02/07/2023      Allergies:Patient has no known allergies.    Current Outpatient Medications   Medication Sig Dispense Refill    metFORMIN (GLUCOPHAGE) 500 MG Tab Take 2 Tablets by mouth 2 times a day with meals. 180 Tablet 0    Empagliflozin (JARDIANCE) 10 MG Tab tablet Take 1 Tablet by mouth every day. 90 Tablet 3    insulin glargine 100 UNIT/ML SC SOPN injection Inject 15 Units under the skin every evening. 5 Each 0    enalapril (VASOTEC) 10 MG Tab Take 1 Tablet by mouth every day. 90 Tablet 0    Blood Glucose Test Strips Use one Accucheck Guide strip to test blood sugar once daily early morning before first meal. 100 Strip 0    Alcohol Swabs Wipe site with prep pad prior to injection. 100 Each 0    Insulin Pen Needle 32 G x 4 mm (BD PEN NEEDLE RANDY 2ND GEN) USE 1  ONCE DAILY 100 Each 2    Lancets Use one Accucheck Guide lancet to test blood sugar once daily early morning before first meal. 100 Each 0    insulin glargine (LANTUS) 100 UNIT/ML SC SOLN Inject 15 Units under the skin.      atorvastatin (LIPITOR) 20 MG Tab Take 20 mg by mouth every day.      amLODIPine (NORVASC) 5 MG Tab Take 5 mg by mouth every day. for 30 days      Blood Glucose Meter Kit Test blood sugar as recommended by provider. Accucheck Guide blood glucose monitoring kit. 1 Kit 0     No current facility-administered medications for this visit.     Social History     Tobacco Use    Smoking status: Never    Smokeless tobacco: Never   Vaping Use    Vaping Use: Never used   Substance Use Topics    Alcohol use: Never    Drug use: Never     Social History     Social History Narrative    Not on file     No family history on file.    Exam:    /60 (BP Location: Right arm, Patient Position: Sitting, BP Cuff Size: Adult)   Pulse 79   Temp 36.2 °C (97.2 °F) (Temporal)   Resp 18   Ht 1.638 m (5' 4.5\")   Wt 68.9 kg (152 " lb)   SpO2 96%  Body mass index is 25.69 kg/m².    General:  Well nourished, well developed male in NAD  HENT: Atraumatic, normocephalic  EYES: Extraocular movements intact, pupils equal and reactive to light  NECK: Supple, FROM  CHEST: No deformities, Equal chest expansion  RESP: Unlabored, no stridor or audible wheeze  ABD: Non-Distended  Extremities: No Clubbing, Cyanosis, or Edema  Skin: Warm/dry, without rashes  Neuro: A/O x 4, CN 2-12 Grossly intact, Motor/sensory grossly intact  Psych: Normal behavior, normal affect    LABS: Results reviewed and discussed with the patient, questions answered.   Latest Reference Range & Units 02/22/24 10:30   WBC 4.8 - 10.8 K/uL 8.1   RBC 4.70 - 6.10 M/uL 5.05   Hemoglobin 14.0 - 18.0 g/dL 15.6   Hematocrit 42.0 - 52.0 % 47.5   MCV 81.4 - 97.8 fL 94.1   MCH 27.0 - 33.0 pg 30.9   MCHC 32.3 - 36.5 g/dL 32.8   RDW 35.9 - 50.0 fL 43.5   Platelet Count 164 - 446 K/uL 228   MPV 9.0 - 12.9 fL 11.4   Neutrophils-Polys 44.00 - 72.00 % 78.70 (H)   Neutrophils (Absolute) 1.82 - 7.42 K/uL 6.38   Lymphocytes 22.00 - 41.00 % 13.40 (L)   Lymphs (Absolute) 1.00 - 4.80 K/uL 1.09   Monocytes 0.00 - 13.40 % 6.90   Monos (Absolute) 0.00 - 0.85 K/uL 0.56   Eosinophils 0.00 - 6.90 % 0.40   Eos (Absolute) 0.00 - 0.51 K/uL 0.03   Basophils 0.00 - 1.80 % 0.20   Baso (Absolute) 0.00 - 0.12 K/uL 0.02   Immature Granulocytes 0.00 - 0.90 % 0.40   Immature Granulocytes (abs) 0.00 - 0.11 K/uL 0.03   Nucleated RBC 0.00 - 0.20 /100 WBC 0.00   NRBC (Absolute) K/uL 0.00   Sodium 135 - 145 mmol/L 133 (L)   Potassium 3.6 - 5.5 mmol/L 4.4   Chloride 96 - 112 mmol/L 99   Co2 20 - 33 mmol/L 21   Anion Gap 7.0 - 16.0  13.0   Glucose 65 - 99 mg/dL 412 (H)   Bun 8 - 22 mg/dL 24 (H)   Creatinine 0.50 - 1.40 mg/dL 1.40   GFR (CKD-EPI) >60 mL/min/1.73 m 2 55 !   Calcium 8.5 - 10.5 mg/dL 9.5   Correct Calcium 8.5 - 10.5 mg/dL 9.4   AST(SGOT) 12 - 45 U/L 20   ALT(SGPT) 2 - 50 U/L 22   Alkaline Phosphatase 30 - 99 U/L 83    Total Bilirubin 0.1 - 1.5 mg/dL 0.5   Albumin 3.2 - 4.9 g/dL 4.1   Total Protein 6.0 - 8.2 g/dL 7.7   Globulin 1.9 - 3.5 g/dL 3.6 (H)   A-G Ratio g/dL 1.1   Glycohemoglobin 4.0 - 5.6 % 14.5 (H)   Estim. Avg Glu mg/dL 369   Micro Alb Creat Ratio 0 - 30 mg/g 142 (H)   Creatinine, Urine mg/dL 55.77   Microalbumin, Urine Random mg/dL 7.9   TSH 0.380 - 5.330 uIU/mL 1.260   (H): Data is abnormally high  (L): Data is abnormally low  !: Data is abnormal    ASSESSMENT/PLAN:    Type 2 diabetes mellitus without complication, with long-term current use of insulin (HCC)  68-year-old male presenting for follow-up on diabetes medication management.  Afebrile, vital signs stable.  BMI 25.  Patient has been taking metformin 500 mg twice daily, no insulin.  Denies any change in vision, polydipsia, dysuria or polyuria.  PE: Unremarkable.  CMP significant for glucose of 412, BUN of 24, GFR 55.  A1c 14.5 and microalbumin creatinine ratio at 142.  Labs consistent with uncontrolled diabetes.    Plan  -Will increase metformin from 500 mg twice daily to 1000 mg twice daily.  - Start Jardiance 10 mg daily for glucose control and kidney protection  - Continue enalapril 10 mg daily for kidney protection  - Start insulin glargine 15 units every evening  - Reordered diabetic supplies including: Lancets, testing strips, alcohol swabs and needles.  - Discussed with patient the importance of testing his blood sugars 3 times a day and keeping a log he will bring to his next appointment.  - Referral to nutrition and diabetic education placed  - Follow-up appointment in 2 months to assess effectiveness of current diabetic regiment.  Will repeat A1c at that time.  If A1c still above 10 we will consider endocrine referral for specialized diabetes management.      Return in about 11 weeks (around 5/23/2024).    Shante Cartwright MD PGY2

## 2024-03-07 NOTE — ASSESSMENT & PLAN NOTE
68-year-old male presenting for follow-up on diabetes medication management.  Afebrile, vital signs stable.  BMI 25.  Patient has been taking metformin 500 mg twice daily, no insulin.  Denies any change in vision, polydipsia, dysuria or polyuria.  PE: Unremarkable.  CMP significant for glucose of 412, BUN of 24, GFR 55.  A1c 14.5 and microalbumin creatinine ratio at 142.  Labs consistent with uncontrolled diabetes.    Plan  -Will increase metformin from 500 mg twice daily to 1000 mg twice daily.  - Start Jardiance 10 mg daily for glucose control and kidney protection  - Continue enalapril 10 mg daily for kidney protection  - Start insulin glargine 15 units every evening  - Reordered diabetic supplies including: Lancets, testing strips, alcohol swabs and needles.  - Discussed with patient the importance of testing his blood sugars 3 times a day and keeping a log he will bring to his next appointment.  - Referral to nutrition and diabetic education placed  - Follow-up appointment in 2 months to assess effectiveness of current diabetic regiment.  Will repeat A1c at that time.  If A1c still above 10 we will consider endocrine referral for specialized diabetes management.

## 2024-03-11 NOTE — TELEPHONE ENCOUNTER
Received request via: Pharmacy    Was the patient seen in the last year in this department? Yes    Does the patient have an active prescription (recently filled or refills available) for medication(s) requested? No    Pharmacy Name: [facility]    Does the patient have [facility] and need 100 day supply (blood pressure, diabetes and cholesterol meds only)? Patient does not have SCP

## 2024-03-12 RX ORDER — ATORVASTATIN CALCIUM 20 MG/1
20 TABLET, FILM COATED ORAL DAILY
Qty: 90 TABLET | Refills: 0 | Status: SHIPPED | OUTPATIENT
Start: 2024-03-12

## 2024-04-23 DIAGNOSIS — Z79.4 TYPE 2 DIABETES MELLITUS WITHOUT COMPLICATION, WITH LONG-TERM CURRENT USE OF INSULIN (HCC): ICD-10-CM

## 2024-04-23 DIAGNOSIS — N18.31 STAGE 3A CHRONIC KIDNEY DISEASE: ICD-10-CM

## 2024-04-23 DIAGNOSIS — E11.9 TYPE 2 DIABETES MELLITUS WITHOUT COMPLICATION, WITH LONG-TERM CURRENT USE OF INSULIN (HCC): ICD-10-CM

## 2024-04-23 NOTE — TELEPHONE ENCOUNTER
Received request via: Pharmacy    Was the patient seen in the last year in this department? Yes    Does the patient have an active prescription (recently filled or refills available) for medication(s) requested? No    [facility]    Does the patient have [facility] and need 100 day supply (blood pressure, diabetes and cholesterol meds only)? Patient does not have [facility]CP

## 2024-04-23 NOTE — TELEPHONE ENCOUNTER
Received request via: Pharmacy    Was the patient seen in the last year in this department? Yes    Does the patient have an active prescription (recently filled or refills available) for medication(s) requested? No    [facility]    Does the patient have [facility] and need 100 day supply (blood pressure, diabetes and cholesterol meds only)? Patient does not have SCP

## 2024-04-25 RX ORDER — ATORVASTATIN CALCIUM 20 MG/1
20 TABLET, FILM COATED ORAL DAILY
Qty: 90 TABLET | Refills: 0 | Status: SHIPPED | OUTPATIENT
Start: 2024-04-25

## 2024-04-25 RX ORDER — ENALAPRIL MALEATE 10 MG/1
10 TABLET ORAL DAILY
Qty: 90 TABLET | Refills: 0 | Status: SHIPPED | OUTPATIENT
Start: 2024-04-25

## 2024-04-26 DIAGNOSIS — Z79.4 TYPE 2 DIABETES MELLITUS WITHOUT COMPLICATION, WITH LONG-TERM CURRENT USE OF INSULIN (HCC): ICD-10-CM

## 2024-04-26 DIAGNOSIS — E11.9 TYPE 2 DIABETES MELLITUS WITHOUT COMPLICATION, WITH LONG-TERM CURRENT USE OF INSULIN (HCC): ICD-10-CM

## 2024-05-30 ENCOUNTER — APPOINTMENT (OUTPATIENT)
Dept: MEDICAL GROUP | Facility: CLINIC | Age: 69
End: 2024-05-30
Payer: MEDICARE

## 2024-05-31 DIAGNOSIS — Z79.4 TYPE 2 DIABETES MELLITUS WITHOUT COMPLICATION, WITH LONG-TERM CURRENT USE OF INSULIN (HCC): ICD-10-CM

## 2024-05-31 DIAGNOSIS — E11.9 TYPE 2 DIABETES MELLITUS WITHOUT COMPLICATION, WITH LONG-TERM CURRENT USE OF INSULIN (HCC): ICD-10-CM

## 2024-06-03 ENCOUNTER — TELEPHONE (OUTPATIENT)
Dept: HEALTH INFORMATION MANAGEMENT | Facility: OTHER | Age: 69
End: 2024-06-03
Payer: MEDICARE

## 2024-06-05 DIAGNOSIS — E11.9 TYPE 2 DIABETES MELLITUS WITHOUT COMPLICATION, WITH LONG-TERM CURRENT USE OF INSULIN (HCC): ICD-10-CM

## 2024-06-05 DIAGNOSIS — Z79.4 TYPE 2 DIABETES MELLITUS WITHOUT COMPLICATION, WITH LONG-TERM CURRENT USE OF INSULIN (HCC): ICD-10-CM

## 2024-06-05 NOTE — TELEPHONE ENCOUNTER
Received request via: Pharmacy    Was the patient seen in the last year in this department? Yes    Does the patient have an active prescription (recently filled or refills available) for medication(s) requested? No    Pharmacy Name: [facility]    Does the patient have [facility] and need 100 day supply (blood pressure, diabetes and cholesterol meds only)? Patient does not have SCP

## 2024-06-17 ENCOUNTER — APPOINTMENT (OUTPATIENT)
Dept: MEDICAL GROUP | Facility: CLINIC | Age: 69
End: 2024-06-17
Payer: MEDICARE

## 2024-06-17 VITALS
WEIGHT: 140.7 LBS | RESPIRATION RATE: 15 BRPM | BODY MASS INDEX: 23.78 KG/M2 | HEART RATE: 72 BPM | OXYGEN SATURATION: 97 % | TEMPERATURE: 97.2 F | DIASTOLIC BLOOD PRESSURE: 72 MMHG | SYSTOLIC BLOOD PRESSURE: 118 MMHG

## 2024-06-17 DIAGNOSIS — E11.9 TYPE 2 DIABETES MELLITUS WITHOUT COMPLICATION, WITH LONG-TERM CURRENT USE OF INSULIN (HCC): ICD-10-CM

## 2024-06-17 DIAGNOSIS — Z79.4 TYPE 2 DIABETES MELLITUS WITHOUT COMPLICATION, WITH LONG-TERM CURRENT USE OF INSULIN (HCC): ICD-10-CM

## 2024-06-17 LAB
HBA1C MFR BLD: 7.5 % (ref ?–5.8)
POCT INT CON NEG: NEGATIVE
POCT INT CON POS: POSITIVE

## 2024-06-17 PROCEDURE — 99213 OFFICE O/P EST LOW 20 MIN: CPT | Mod: GE

## 2024-06-17 PROCEDURE — 83036 HEMOGLOBIN GLYCOSYLATED A1C: CPT | Mod: GE

## 2024-06-17 ASSESSMENT — FIBROSIS 4 INDEX: FIB4 SCORE: 1.25

## 2024-06-17 NOTE — PROGRESS NOTES
CC:The encounter diagnosis was Type 2 diabetes mellitus without complication, with long-term current use of insulin ([facility]).    HISTORY OF PRESENT ILLNESS: Patient is a 69 y.o. male established patient who presents today for the following:    Problem   Type 2 Diabetes Mellitus Without Complication, With Long-Term Current Use of Insulin ([facility])    Last Visit:   2/22/2024  Patient with history of diabetes was previously on metformin and insulin.  Patient is accompanied by his son who translated due to the patient speaking Salvad[place].  Son reported the following: Patient's daughter was managing his care until a couple months ago when she told her brother she can do it anymore.  Daughter was giving patient injections which she has not received since December.  Patient is a poor historian and is uncertain what medications he is taking.  Denies any headache, dizziness, lightheadedness, increased thirst, shortness of breath, chest pain, abdominal pain or polyuria.    3/7/2024  Patient presents today for follow-up on diabetes management.  Son is present during this visit to help with translation as patient speaks Salvadorean.  Patient brought his pills that he had been taking which consists of metformin 500 mg twice daily, enalapril 10 mg daily and atorvastatin 20 mg daily.  Patient has not been taking his insulin.  Patient believes that taking these pills will help cure him of his diabetes and he does not need insulin.  Patient continues to have a poor diet drinking lots of sugary beverages and eating simple carbs.  Patient denies any headache, change in vision, shortness of breath, chest pain, nausea, vomiting, abdominal pain, diarrhea, dysuria, polyuria.    Today's Visit:  6/17/2024  Patient presents to clinic for an A1c check.  Patient is compliant on his insulin regiment, metformin 1000 mg twice daily and Jardiance 10 mg daily.  Patient has been keeping track of his blood sugars which have ranged from 100-170s.  Patient  denies any episodes of lightheadedness or dizziness.  Patient also reports that he has lost weight.  Denies any increased thirst, shortness of breath, chest pain, abdominal pain, dysuria, polyuria or diarrhea.          Patient Active Problem List    Diagnosis Date Noted    Chronic kidney disease (CKD) 06/20/2023    History of alcohol abuse 06/20/2023    Type 2 diabetes mellitus without complication, with long-term current use of insulin ([facility]) 02/07/2023    Essential hypertension 02/07/2023    Colostomy care ([facility]) 02/07/2023        Allergies:Patient has no known allergies.    Current Outpatient Medications   Medication Sig Dispense Refill    metFORMIN (GLUCOPHAGE) 500 MG Tab TAKE 2 TABLETS BY MOUTH TWICE DAILY WITH MEALS 180 Tablet 0    atorvastatin (LIPITOR) 20 MG Tab Take 1 Tablet by mouth every day. 90 Tablet 0    enalapril (VASOTEC) 10 MG Tab Take 1 Tablet by mouth every day. 90 Tablet 0    Empagliflozin (JARDIANCE) 10 MG Tab tablet Take 1 Tablet by mouth every day. 90 Tablet 3    Blood Glucose Test Strips Use one Accucheck Guide strip to test blood sugar once daily early morning before first meal. 100 Strip 0    Alcohol Swabs Wipe site with prep pad prior to injection. 100 Each 0    Lancets Use one Accucheck Guide lancet to test blood sugar once daily early morning before first meal. 100 Each 0    amLODIPine (NORVASC) 5 MG Tab Take 5 mg by mouth every day. for 30 days      Blood Glucose Meter Kit Test blood sugar as recommended by provider. Accucheck Guide blood glucose monitoring kit. 1 Kit 0     No current facility-administered medications for this visit.       Social History     Tobacco Use    Smoking status: Never    Smokeless tobacco: Never   Vaping Use    Vaping status: Never Used   Substance Use Topics    Alcohol use: Never    Drug use: Never     Social History     Social History Narrative    Not on file       No family history on file.    Exam:    /72 (BP Location: Left arm, Patient Position:  Sitting, BP Cuff Size: Adult)   Pulse 72   Temp 36.2 °C (97.2 °F) (Temporal)   Resp 15   Wt 63.8 kg (140 lb 11.2 oz)   SpO2 97%  Body mass index is 23.78 kg/m².    General:  Well nourished, well developed male in NAD  HENT: Atraumatic, normocephalic  EYES: Extraocular movements intact, pupils equal and reactive to light  NECK: Supple, FROM  CHEST: No deformities, Equal chest expansion  RESP: Unlabored, no stridor or audible wheeze  ABD: Non-Distended  Extremities: No Clubbing, Cyanosis, or Edema  Skin: Warm/dry, without rashes  Neuro: A/O x 4, CN 2-12 Grossly intact, Motor/sensory grossly intact  Psych: Normal behavior, normal affect    LABS: Results reviewed and discussed with the patient, questions answered.   Latest Reference Range & Units 02/22/24 10:30 06/17/24 16:34   Glycohemoglobin 5.8 % 14.5 (H) 7.5 !   (H): Data is abnormally high  !: Data is abnormal    ASSESSMENT/PLAN:    Type 2 diabetes mellitus without complication, with long-term current use of insulin ([facility])  69-year-old male with history of type 2 diabetes currently on insulin, metformin 1000 mg twice daily and Jardiance 10 mg daily.  Tolerating medication denies any side effects.  Blood sugars under good control.  No episodes of hypoglycemia or hyperglycemia.  PE: Unremarkable.  Today's A1c decreased from 14.5 to 7.5.     Plan  - Will discontinue insulin use given improvement in A1c.  Patient will continue to take metformin 1000 mg twice daily and Jardiance 10 mg daily.  - Patient will call to schedule an appointment with diabetes education, telephone number and information given.  - Encourage patient to eat a diet rich in fruits and vegetables and getting 150 minutes of moderate exercise daily.  - Continue atorvastatin and enalapril  - Follow-up appointment in 90 days    Return in about 3 months (around 9/17/2024).    Shante Cartwright MD PGY2

## 2024-06-18 NOTE — ASSESSMENT & PLAN NOTE
69-year-old male with history of type 2 diabetes currently on insulin, metformin 1000 mg twice daily and Jardiance 10 mg daily.  Tolerating medication denies any side effects.  Blood sugars under good control.  No episodes of hypoglycemia or hyperglycemia.  PE: Unremarkable.  Today's A1c decreased from 14.5 to 7.5.     Plan  - Will discontinue insulin use given improvement in A1c.  Patient will continue to take metformin 1000 mg twice daily and Jardiance 10 mg daily.  - Patient will call to schedule an appointment with diabetes education, telephone number and information given.  - Encourage patient to eat a diet rich in fruits and vegetables and getting 150 minutes of moderate exercise daily.  - Continue atorvastatin and enalapril  - Follow-up appointment in 90 days

## 2024-09-04 ENCOUNTER — APPOINTMENT (OUTPATIENT)
Dept: INTERNAL MEDICINE | Facility: OTHER | Age: 69
End: 2024-09-04
Payer: MEDICARE